# Patient Record
Sex: FEMALE | Race: WHITE | Employment: OTHER | URBAN - METROPOLITAN AREA
[De-identification: names, ages, dates, MRNs, and addresses within clinical notes are randomized per-mention and may not be internally consistent; named-entity substitution may affect disease eponyms.]

---

## 2019-10-22 ENCOUNTER — APPOINTMENT (OUTPATIENT)
Dept: GENERAL RADIOLOGY | Age: 49
End: 2019-10-22
Payer: MEDICAID

## 2019-10-22 ENCOUNTER — HOSPITAL ENCOUNTER (EMERGENCY)
Age: 49
Discharge: HOME | End: 2019-10-22
Attending: EMERGENCY MEDICINE
Payer: MEDICAID

## 2019-10-22 VITALS
DIASTOLIC BLOOD PRESSURE: 70 MMHG | SYSTOLIC BLOOD PRESSURE: 123 MMHG | HEART RATE: 68 BPM | TEMPERATURE: 97.6 F | HEIGHT: 61 IN | WEIGHT: 247.8 LBS | BODY MASS INDEX: 46.78 KG/M2 | OXYGEN SATURATION: 100 % | RESPIRATION RATE: 16 BRPM

## 2019-10-22 DIAGNOSIS — M25.561 ACUTE PAIN OF RIGHT KNEE: Primary | ICD-10-CM

## 2019-10-22 PROCEDURE — 6370000000 HC RX 637 (ALT 250 FOR IP): Performed by: EMERGENCY MEDICINE

## 2019-10-22 PROCEDURE — 99283 EMERGENCY DEPT VISIT LOW MDM: CPT

## 2019-10-22 PROCEDURE — 73560 X-RAY EXAM OF KNEE 1 OR 2: CPT

## 2019-10-22 RX ORDER — IBUPROFEN 600 MG/1
600 TABLET ORAL EVERY 8 HOURS PRN
Qty: 15 TABLET | Refills: 0 | Status: SHIPPED | OUTPATIENT
Start: 2019-10-22 | End: 2019-12-09

## 2019-10-22 RX ORDER — HYDROCODONE BITARTRATE AND ACETAMINOPHEN 5; 325 MG/1; MG/1
1 TABLET ORAL ONCE
Status: COMPLETED | OUTPATIENT
Start: 2019-10-22 | End: 2019-10-22

## 2019-10-22 RX ORDER — IBUPROFEN 600 MG/1
600 TABLET ORAL ONCE
Status: COMPLETED | OUTPATIENT
Start: 2019-10-22 | End: 2019-10-22

## 2019-10-22 RX ADMIN — IBUPROFEN 600 MG: 600 TABLET, FILM COATED ORAL at 13:38

## 2019-10-22 RX ADMIN — HYDROCODONE BITARTRATE AND ACETAMINOPHEN 1 TABLET: 5; 325 TABLET ORAL at 13:38

## 2019-10-22 ASSESSMENT — PAIN DESCRIPTION - PAIN TYPE
TYPE: ACUTE PAIN;CHRONIC PAIN
TYPE: ACUTE PAIN;CHRONIC PAIN

## 2019-10-22 ASSESSMENT — ENCOUNTER SYMPTOMS
TROUBLE SWALLOWING: 0
SHORTNESS OF BREATH: 0
NAUSEA: 0
DIARRHEA: 0
VOICE CHANGE: 0
VOMITING: 0

## 2019-10-22 ASSESSMENT — PAIN DESCRIPTION - DESCRIPTORS: DESCRIPTORS: ACHING

## 2019-10-22 ASSESSMENT — PAIN DESCRIPTION - ORIENTATION: ORIENTATION: RIGHT

## 2019-10-22 ASSESSMENT — PAIN SCALES - GENERAL
PAINLEVEL_OUTOF10: 7
PAINLEVEL_OUTOF10: 8
PAINLEVEL_OUTOF10: 10

## 2019-10-22 ASSESSMENT — PAIN DESCRIPTION - LOCATION: LOCATION: KNEE

## 2019-10-22 ASSESSMENT — PAIN DESCRIPTION - FREQUENCY: FREQUENCY: CONTINUOUS

## 2019-12-09 ENCOUNTER — HOSPITAL ENCOUNTER (EMERGENCY)
Age: 49
Discharge: HOME | End: 2019-12-09
Attending: EMERGENCY MEDICINE
Payer: MEDICAID

## 2019-12-09 VITALS
SYSTOLIC BLOOD PRESSURE: 140 MMHG | TEMPERATURE: 100.4 F | WEIGHT: 244.05 LBS | OXYGEN SATURATION: 99 % | DIASTOLIC BLOOD PRESSURE: 70 MMHG | HEIGHT: 61 IN | HEART RATE: 93 BPM | BODY MASS INDEX: 46.08 KG/M2 | RESPIRATION RATE: 19 BRPM

## 2019-12-09 DIAGNOSIS — B34.9 VIRAL ILLNESS: Primary | ICD-10-CM

## 2019-12-09 DIAGNOSIS — R51.9 VIRAL CEPHALGIA: ICD-10-CM

## 2019-12-09 DIAGNOSIS — B34.9 VIRAL CEPHALGIA: ICD-10-CM

## 2019-12-09 LAB
RAPID INFLUENZA  B AGN: NEGATIVE
RAPID INFLUENZA A AGN: NEGATIVE

## 2019-12-09 PROCEDURE — 87804 INFLUENZA ASSAY W/OPTIC: CPT

## 2019-12-09 PROCEDURE — 96375 TX/PRO/DX INJ NEW DRUG ADDON: CPT

## 2019-12-09 PROCEDURE — 2580000003 HC RX 258: Performed by: EMERGENCY MEDICINE

## 2019-12-09 PROCEDURE — 96361 HYDRATE IV INFUSION ADD-ON: CPT

## 2019-12-09 PROCEDURE — 99283 EMERGENCY DEPT VISIT LOW MDM: CPT

## 2019-12-09 PROCEDURE — 6360000002 HC RX W HCPCS: Performed by: EMERGENCY MEDICINE

## 2019-12-09 PROCEDURE — 96374 THER/PROPH/DIAG INJ IV PUSH: CPT

## 2019-12-09 RX ORDER — DIPHENHYDRAMINE HYDROCHLORIDE 50 MG/ML
50 INJECTION INTRAMUSCULAR; INTRAVENOUS ONCE
Status: COMPLETED | OUTPATIENT
Start: 2019-12-09 | End: 2019-12-09

## 2019-12-09 RX ORDER — LORAZEPAM 2 MG/ML
0.5 INJECTION INTRAMUSCULAR ONCE
Status: COMPLETED | OUTPATIENT
Start: 2019-12-09 | End: 2019-12-09

## 2019-12-09 RX ORDER — METOCLOPRAMIDE HYDROCHLORIDE 5 MG/ML
10 INJECTION INTRAMUSCULAR; INTRAVENOUS ONCE
Status: COMPLETED | OUTPATIENT
Start: 2019-12-09 | End: 2019-12-09

## 2019-12-09 RX ORDER — 0.9 % SODIUM CHLORIDE 0.9 %
1000 INTRAVENOUS SOLUTION INTRAVENOUS ONCE
Status: COMPLETED | OUTPATIENT
Start: 2019-12-09 | End: 2019-12-09

## 2019-12-09 RX ADMIN — DIPHENHYDRAMINE HYDROCHLORIDE 50 MG: 50 INJECTION, SOLUTION INTRAMUSCULAR; INTRAVENOUS at 21:44

## 2019-12-09 RX ADMIN — LORAZEPAM 0.5 MG: 2 INJECTION INTRAMUSCULAR; INTRAVENOUS at 21:43

## 2019-12-09 RX ADMIN — METOCLOPRAMIDE 10 MG: 5 INJECTION, SOLUTION INTRAMUSCULAR; INTRAVENOUS at 21:46

## 2019-12-09 RX ADMIN — SODIUM CHLORIDE 1000 ML: 9 INJECTION, SOLUTION INTRAVENOUS at 21:44

## 2019-12-09 ASSESSMENT — PAIN - FUNCTIONAL ASSESSMENT: PAIN_FUNCTIONAL_ASSESSMENT: 0-10

## 2019-12-09 ASSESSMENT — PAIN DESCRIPTION - FREQUENCY: FREQUENCY: CONTINUOUS

## 2019-12-09 ASSESSMENT — PAIN DESCRIPTION - DESCRIPTORS: DESCRIPTORS: HEADACHE;THROBBING

## 2019-12-09 ASSESSMENT — PAIN SCALES - GENERAL
PAINLEVEL_OUTOF10: 4
PAINLEVEL_OUTOF10: 10
PAINLEVEL_OUTOF10: 4

## 2021-06-02 ENCOUNTER — CLINICAL DOCUMENTATION (OUTPATIENT)
Dept: OTHER | Age: 51
End: 2021-06-02

## 2021-11-15 ENCOUNTER — CLINICAL DOCUMENTATION (OUTPATIENT)
Dept: OTHER | Age: 51
End: 2021-11-15

## 2021-11-30 ENCOUNTER — HOSPITAL ENCOUNTER (EMERGENCY)
Age: 51
Discharge: HOME | End: 2021-11-30
Attending: EMERGENCY MEDICINE
Payer: MEDICAID

## 2021-11-30 VITALS
RESPIRATION RATE: 15 BRPM | DIASTOLIC BLOOD PRESSURE: 63 MMHG | HEART RATE: 86 BPM | BODY MASS INDEX: 44.33 KG/M2 | HEIGHT: 61 IN | OXYGEN SATURATION: 97 % | SYSTOLIC BLOOD PRESSURE: 98 MMHG | TEMPERATURE: 98.1 F | WEIGHT: 234.79 LBS

## 2021-11-30 DIAGNOSIS — R51.9 ACUTE NONINTRACTABLE HEADACHE, UNSPECIFIED HEADACHE TYPE: Primary | ICD-10-CM

## 2021-11-30 DIAGNOSIS — Z86.69 H/O MIGRAINE: ICD-10-CM

## 2021-11-30 PROCEDURE — 96375 TX/PRO/DX INJ NEW DRUG ADDON: CPT

## 2021-11-30 PROCEDURE — 6360000002 HC RX W HCPCS: Performed by: EMERGENCY MEDICINE

## 2021-11-30 PROCEDURE — 99284 EMERGENCY DEPT VISIT MOD MDM: CPT

## 2021-11-30 PROCEDURE — 2580000003 HC RX 258: Performed by: EMERGENCY MEDICINE

## 2021-11-30 PROCEDURE — 96374 THER/PROPH/DIAG INJ IV PUSH: CPT

## 2021-11-30 RX ORDER — 0.9 % SODIUM CHLORIDE 0.9 %
1000 INTRAVENOUS SOLUTION INTRAVENOUS ONCE
Status: COMPLETED | OUTPATIENT
Start: 2021-11-30 | End: 2021-11-30

## 2021-11-30 RX ORDER — BUTALBITAL, ACETAMINOPHEN AND CAFFEINE 300; 40; 50 MG/1; MG/1; MG/1
1 CAPSULE ORAL EVERY 6 HOURS PRN
Qty: 20 CAPSULE | Refills: 0 | Status: SHIPPED | OUTPATIENT
Start: 2021-11-30 | End: 2022-06-23

## 2021-11-30 RX ORDER — IBUPROFEN 600 MG/1
600 TABLET ORAL EVERY 8 HOURS PRN
Qty: 20 TABLET | Refills: 0 | Status: SHIPPED | OUTPATIENT
Start: 2021-11-30 | End: 2022-09-29

## 2021-11-30 RX ORDER — ONDANSETRON 4 MG/1
4 TABLET, ORALLY DISINTEGRATING ORAL EVERY 8 HOURS PRN
Qty: 20 TABLET | Refills: 0 | Status: SHIPPED | OUTPATIENT
Start: 2021-11-30 | End: 2021-12-07

## 2021-11-30 RX ORDER — KETOROLAC TROMETHAMINE 15 MG/ML
15 INJECTION, SOLUTION INTRAMUSCULAR; INTRAVENOUS ONCE
Status: COMPLETED | OUTPATIENT
Start: 2021-11-30 | End: 2021-11-30

## 2021-11-30 RX ORDER — DIPHENHYDRAMINE HYDROCHLORIDE 50 MG/ML
25 INJECTION INTRAMUSCULAR; INTRAVENOUS ONCE
Status: COMPLETED | OUTPATIENT
Start: 2021-11-30 | End: 2021-11-30

## 2021-11-30 RX ORDER — METOCLOPRAMIDE HYDROCHLORIDE 5 MG/ML
10 INJECTION INTRAMUSCULAR; INTRAVENOUS ONCE
Status: COMPLETED | OUTPATIENT
Start: 2021-11-30 | End: 2021-11-30

## 2021-11-30 RX ADMIN — SODIUM CHLORIDE 1000 ML: 9 INJECTION, SOLUTION INTRAVENOUS at 21:05

## 2021-11-30 RX ADMIN — METOCLOPRAMIDE HYDROCHLORIDE 10 MG: 5 INJECTION INTRAMUSCULAR; INTRAVENOUS at 21:07

## 2021-11-30 RX ADMIN — DIPHENHYDRAMINE HYDROCHLORIDE 25 MG: 50 INJECTION, SOLUTION INTRAMUSCULAR; INTRAVENOUS at 21:07

## 2021-11-30 RX ADMIN — KETOROLAC TROMETHAMINE 15 MG: 15 INJECTION, SOLUTION INTRAMUSCULAR; INTRAVENOUS at 21:08

## 2021-11-30 ASSESSMENT — PAIN DESCRIPTION - PAIN TYPE: TYPE: ACUTE PAIN

## 2021-11-30 ASSESSMENT — PAIN DESCRIPTION - FREQUENCY: FREQUENCY: CONTINUOUS

## 2021-11-30 ASSESSMENT — PAIN DESCRIPTION - DESCRIPTORS: DESCRIPTORS: THROBBING

## 2021-11-30 ASSESSMENT — PAIN SCALES - GENERAL
PAINLEVEL_OUTOF10: 8
PAINLEVEL_OUTOF10: 1
PAINLEVEL_OUTOF10: 0

## 2021-11-30 ASSESSMENT — PAIN DESCRIPTION - PROGRESSION: CLINICAL_PROGRESSION: GRADUALLY WORSENING

## 2021-11-30 ASSESSMENT — PAIN DESCRIPTION - ONSET: ONSET: ON-GOING

## 2021-11-30 ASSESSMENT — PAIN DESCRIPTION - LOCATION: LOCATION: HEAD

## 2021-11-30 ASSESSMENT — PAIN DESCRIPTION - DIRECTION: RADIATING_TOWARDS: ALL OVER

## 2021-12-01 NOTE — ED PROVIDER NOTES
Emergency Physician Note  [facility] [address]  Dept: [phone]  Loc: [phone]  Open Note Time:  8:49 PM EST    Chief Complaint  Migraine (x1day, denies n/v)       History of Present Illness  Lonnie Ricardo is a 54 y.o. female  has a past medical history of Arthritis, Back pain, and Headache. who presents to the ED for headache for 1 day. Patient does have a history of migraines. She states this headache is typical of the migraine she has had in the past in which she has pain all over but she specifically notes bilateral temporal pain that she describes as a throbbing pain. She states she tried Excedrin at home without relief. The pain is associated with photophobia but not phonophobia. She is not had any fever or neck pain. It was not thunderclap in onset. She is not had nausea or vomiting. Denies fever,  chest pain, shortness of breath, cough, abdominal pain, nausea, vomiting, diarrhea,  sore throat, dysuria, back pain, rash. No palliative/provocative factors. Unless otherwise stated in this report or unable to obtain because of the patient's clinical or mental status as evidenced by the medical record, this patient's positive and negative responses for review of systems, constitutional, psych, eyes, ENT, cardiovascular, respiratory, gastrointestinal, neurological, genitourinary, musculoskeletal, integument systems and systems related to the presenting problem are either stated in the preceding paragraph or were not pertinent or were negative for the symptoms and/or complaints related to the medical problem.     I have reviewed the following from the nursing documentation:      Prior to Admission medications    Not on File       Allergies as of 11/30/2021 - Fully Reviewed 11/30/2021   Allergen Reaction Noted    Amoxicillin  07/24/2016    Penicillins  07/24/2016       Past Medical History: Diagnosis Date    Arthritis     Back pain     Headache         Surgical History:   Past Surgical History:   Procedure Laterality Date     SECTION          Family History:  History reviewed. No pertinent family history. Social History     Socioeconomic History    Marital status: Single     Spouse name: Not on file    Number of children: Not on file    Years of education: Not on file    Highest education level: Not on file   Occupational History    Not on file   Tobacco Use    Smoking status: Never Smoker    Smokeless tobacco: Never Used   Substance and Sexual Activity    Alcohol use: No    Drug use: No    Sexual activity: Not Currently   Other Topics Concern    Not on file   Social History Narrative    Not on file     Social Determinants of Health     Financial Resource Strain:     Difficulty of Paying Living Expenses: Not on file   Food Insecurity:     Worried About Running Out of Food in the Last Year: Not on file    Quita of Food in the Last Year: Not on file   Transportation Needs:     Lack of Transportation (Medical): Not on file    Lack of Transportation (Non-Medical):  Not on file   Physical Activity:     Days of Exercise per Week: Not on file    Minutes of Exercise per Session: Not on file   Stress:     Feeling of Stress : Not on file   Social Connections:     Frequency of Communication with Friends and Family: Not on file    Frequency of Social Gatherings with Friends and Family: Not on file    Attends Restorationist Services: Not on file    Active Member of Clubs or Organizations: Not on file    Attends Club or Organization Meetings: Not on file    Marital Status: Not on file   Intimate Partner Violence:     Fear of Current or Ex-Partner: Not on file    Emotionally Abused: Not on file    Physically Abused: Not on file    Sexually Abused: Not on file   Housing Stability:     Unable to Pay for Housing in the Last Year: Not on file    Number of [place] in the Last Year: Not on file    Unstable Housing in the Last Year: Not on file       Nursing notes reviewed. ED Triage Vitals [11/30/21 1958]   Enc Vitals Group      /75      Pulse 87      Resp 20      Temp 98.1 °F (36.7 °C)      Temp Source Oral      SpO2 98 %      Weight 234 lb 12.6 oz (106.5 kg)      Height 5' 1\" (1.549 m)      Head Circumference       Peak Flow       Pain Score       Pain Loc       Pain Edu? Excl. in [address]? GENERAL:   Body mass index is 44.36 kg/m². Awake, alert. Well developed, well nourished with no apparent distress. Nontoxic-appearing, non-ill-appearing. HENT:   Normocephalic, Atraumatic, no lacerations. No ENT exam due to PPE. EYES:   Conjunctiva normal,   Pupils equal round and reactive to light,   Extraocular movements normal.  NECK:  Trachea is midline. No stridor. CHEST:  Regular rate and regular rhythm, no murmurs/rubs/gallops,  normal S1/S2, chest wall non-tender. LUNGS:  No respiratory distress. No abdominal retractions, no sternal retractions  Clear to auscultation bilaterally, no wheezing, no rhochi, no rales  Speaking comfortably in full sentences  ABDOMEN:  Soft, non-tender, non-distended. No guarding. No rebound. No costovertebral angle tenderness to palpation. Normal BS, no organomegaly, no abdominal masses  EXTREMITIES:  Moves extremities x4 with purpose. Normal range of motion, no edema,  No tenderness, no deformity,  distal pulses present and equal bilaterally. BACK:  No midline tenderness in the cervical, thoracic, and lumbar spine. No deformities, no step-off. SKIN:  Warm, dry and intact. NEUROLOGIC:  Normal mental status. Moving all extremities to command. Alert and oriented x4  without focal motor deficit or gross sensory deficit. Normal speech. Strength 5/5 in bilateral upper and lower extremities. Sensation is intact in the upper and lower extremities.   PSYCHIATRIC:  Not anxious,  normal mood and affect,  Appropriate eye contact,  thoughts are linear and organized,  without delusions/hallucinations,  Not responding to internal stimuli,  responds appropriately to questions    LABS and DIAGNOSTIC RESULTS      RADIOLOGY  X-RAYS:  I have reviewed radiologic plain film image(s). ALL OTHER NON-PLAIN FILM IMAGES SUCH AS CT, ULTRASOUND AND MRI HAVE BEEN READ BY THE RADIOLOGIST. No orders to display            LABS  No results found for this visit on 11/30/21. SCREENINGS  NIH Score     [facility]     [facility]    Heart Score       PROCEDURES    MEDICAL DECISION MAKING    Procedures/interventions/images ordered for this visit  No orders of the defined types were placed in this encounter. Medications ordered for this visit  Orders Placed This Encounter   Medications    ketorolac (TORADOL) injection 15 mg    metoclopramide (REGLAN) injection 10 mg    diphenhydrAMINE (BENADRYL) injection 25 mg    0.9 % sodium chloride bolus       ED course notes for this visit  ED Course as of 11/30/21 2307 Tue Nov 30, 2021   2223 Reports feeling significantly better, she reports that her headache is completely resolved. She does have a referral to another neurologist, she cannot remember the name of this doctor. However she states she will call for seen in the morning to get an appointment. [SG]      ED Course User Index  [SG] David Wilkinson MD       I evaluated the patient in room QC 10/10        This is a pleasant patient with a headache. There are no significant findings indicating subarachnoid hemorrhage, venous sinus thrombosis, epidural hematoma, subdural hematoma, meningitis, encephalitis, glaucoma, temporal arteritis, or other new intracranial, vascular, infectious, ophthalmological, or systemic processes requiring immediate medical or surgical intervention at this time.  I do not see indication for labs or imaging studies at this time based on the fact that this is the same headache as this patient has had many times in the past, it is not different in character or quality, it is not the worst headache of their life, and it was not thunderclap in onset. Also, in consideration of SAH, patient does not have high-risk historical features of SAH (  maximal intensity of headache within 1 hr of onset, worst headache of life, neck pain or stiffness, loss of consciousness, onset during exertion, ambulance arrival, vomiting). At the time of d/c home, the patient is ambulatory, pain has improved, vital signs are stable and there are no neurological deficits. I completed a structured, evidence-based clinical evaluation to screen for subarachnoid hemorrhage. The evidence indicates that the patient is very low risk for [facility] and this is consistent with my clinical intuition. The risk of further workup or hospitalization is likely higher than the risk of the patient having a subarachnoid hemorrhage. It is, therefore, in the patients best interest not to do additional emergent testing. I have discussed with the patient my clinical impression and the result of an evidence-based clinical evaluation to screen for [facility], as well as the risk of further testing and hospitalization. The evidence shows that the risk for subarachnoid hemorrhage is less than 1/200 or 0.5%. Further testing involves either invasive angiogram with about a 1% risk of serious complications, or hospitalization, with about a 1% risk of serious complications. The patient understands the residual risk of SAH and the risk of further testing and declines further emergent evaluation or hospitalization for subarachnoid hemorrhage. It is understood that if the patient is not improving as expected or if other new symptoms or signs of concern develop, other etiologies or diagnoses may need to be considered requiring other tests, treatments, consultations, and/or admission. The diagnosis, plan, expected course, follow-up, and return precautions were discussed and all questions were answered. Final Impression    1. Acute nonintractable headache, unspecified headache type    2. H/O migraine        Blood pressure 98/63, pulse 86, temperature 98.1 °F (36.7 °C), temperature source Oral, resp. rate 15, height 5' 1\" (1.549 m), weight 234 lb 12.6 oz (106.5 kg), SpO2 97 %. Medication Summary  Patient was given scripts for the following medications. I counseled patient how to take these medications. New Prescriptions    BUTALBITAL-APAP-CAFFEINE (FIORICET) -40 MG CAPS PER CAPSULE    Take 1 capsule by mouth every 6 hours as needed for Headaches or Migraine    IBUPROFEN (ADVIL;MOTRIN) 600 MG TABLET    Take 1 tablet by mouth every 8 hours as needed for Pain (Headache)    ONDANSETRON (ZOFRAN-ODT) 4 MG DISINTEGRATING TABLET    Place 1 tablet under the tongue every 8 hours as needed for Nausea or Vomiting       Patient had scripts modified or refilled for the following medications. I counseled patient how to take these medications. Modified Medications    No medications on file       Patient had scripts discontinues for the following medications. I counseled patient to stop taking these medications. Discontinued Medications    No medications on file         Disposition  At this point I do not feel the patient requires further work up and it is reasonable to discharge the patient. I had a discussion with the patient and/or their surrogate regarding diagnosis, diagnostic testing results, treatment/ plan of care, and follow up. Patient and/or companions verbalized understanding of the ED workup, any relevant findings as well as any incidental findings, and the disposition and plan. There was shared decision-making between myself as well as the patient and/or their surrogate and we are all in agreement with discharge home. Zoila Syed was an opportunity for questions and all questions were answered to the best of my ability and to the satisfaction of the patient and/or patient's family.  Patient agreed to follow up as recommend for further evaluation/treatment. The patient was given strict return precautions as we discussed symptoms that would necessitate return to the ED. Patient will return to ED for new/worsening symptoms. The patient verbalized their understanding and agreement with the above plan. Please refer to AVS for further details regarding discharge instructions. Pt is in stable condition upon Discharge to home. Pt was seen during the [place] 19 pandemic. Appropriate PPE worn by this writer during patient encounters. Pt seen during a time with constrained hospital bed capacity and other potential inpatient and outpatient resources were constrained due to the viral pandemic. The note was completed using Dragon voice recognition transcription. Every effort was made to ensure accuracy; however, inadvertent transcription errors may be present despite my best efforts to edit errors.     Ty Roman MD  [facility]        Ty Roman MD  11/30/21 9584

## 2021-12-01 NOTE — ED NOTES
Discharge and education instructions reviewed. Patient verbalized understanding, teach-back successful. Patient denied questions at this time. No acute distress noted. Patient instructed to follow-up as noted - return to emergency department if symptoms worsen. Patient verbalized understanding. Discharged per EDMD with discharge instructions.         [address]agan RN  11/30/21 8165

## 2021-12-01 NOTE — ED NOTES
Pt sleeping in room. States headache has improved. Dr Betzaida Hong notified.      [address] [facility], RN  11/30/21 9496

## 2021-12-01 NOTE — ED NOTES
Pt to ED with complaints of migraine that began yesterday afternoon. States it is getting progressively worse. Pt has hx of migraines. States used to see neurologist but in between at the moment. Did not take any medications. Denies changes in vision, nausea, and vomiting. A/ox4. Pt resting in a dark room.       [address], RN  11/30/21 2006

## 2022-01-31 ENCOUNTER — HOSPITAL ENCOUNTER (EMERGENCY)
Age: 52
Discharge: HOME | End: 2022-01-31
Attending: EMERGENCY MEDICINE
Payer: MEDICAID

## 2022-01-31 VITALS
TEMPERATURE: 97.9 F | RESPIRATION RATE: 19 BRPM | SYSTOLIC BLOOD PRESSURE: 138 MMHG | DIASTOLIC BLOOD PRESSURE: 86 MMHG | HEART RATE: 78 BPM | OXYGEN SATURATION: 98 %

## 2022-01-31 DIAGNOSIS — R22.1 NECK SWELLING: Primary | ICD-10-CM

## 2022-01-31 PROCEDURE — 6360000002 HC RX W HCPCS: Performed by: EMERGENCY MEDICINE

## 2022-01-31 PROCEDURE — 96372 THER/PROPH/DIAG INJ SC/IM: CPT

## 2022-01-31 PROCEDURE — 6370000000 HC RX 637 (ALT 250 FOR IP): Performed by: EMERGENCY MEDICINE

## 2022-01-31 PROCEDURE — 99283 EMERGENCY DEPT VISIT LOW MDM: CPT

## 2022-01-31 RX ORDER — KETOROLAC TROMETHAMINE 30 MG/ML
15 INJECTION, SOLUTION INTRAMUSCULAR; INTRAVENOUS ONCE
Status: COMPLETED | OUTPATIENT
Start: 2022-01-31 | End: 2022-01-31

## 2022-01-31 RX ORDER — LIDOCAINE 4 G/G
1 PATCH TOPICAL ONCE
Status: DISCONTINUED | OUTPATIENT
Start: 2022-01-31 | End: 2022-01-31 | Stop reason: HOSPADM

## 2022-01-31 RX ORDER — NAPROXEN 500 MG/1
500 TABLET ORAL 2 TIMES DAILY WITH MEALS
Qty: 60 TABLET | Refills: 5 | Status: SHIPPED | OUTPATIENT
Start: 2022-01-31

## 2022-01-31 RX ADMIN — KETOROLAC TROMETHAMINE 15 MG: 30 INJECTION, SOLUTION INTRAMUSCULAR at 21:14

## 2022-01-31 ASSESSMENT — PAIN SCALES - GENERAL: PAINLEVEL_OUTOF10: 8

## 2022-02-01 ASSESSMENT — ENCOUNTER SYMPTOMS
SORE THROAT: 0
COLOR CHANGE: 0
TROUBLE SWALLOWING: 0
VOMITING: 0
ABDOMINAL PAIN: 0
COUGH: 0
PHOTOPHOBIA: 0
SHORTNESS OF BREATH: 0

## 2022-02-01 NOTE — ED PROVIDER NOTES
[address] [facility]  [facility]      Pt Name: Ritchie Villagomez  MRN: [id number]  [place] 1970  Date ofevaluation: 1/31/2022  Provider: Carlito Pyle MD    CHIEF COMPLAINT       Chief Complaint   Patient presents with    Neck Swelling         HISTORY OF PRESENT ILLNESS   (Location/Symptom, Timing/Onset,Context/Setting, Quality, Duration, Modifying Factors, Severity)  Note limiting factors. Ritchie Villagomez is a 54 y.o. female  who  has a past medical history of Arthritis, Back pain, and Headache. who presents to the emergency department for evaluation of swelling under the angle of the right jaw. Patient reports that she been having episodes of swelling under the right jaw since she was a child. She reports that symptoms will resolve spontaneously and recur spontaneously. She states that the areas become tender. She states that for the past few days she has noticed swelling to the angle of the right jaw. She does report is slightly tender. Denies ear pain or sore throat. Denies fevers or overlying skin changes. Denies difficulties breathing or swallowing. Denies changes in voice. She has not taken medications for the symptoms. HPI    NursingNotes were reviewed. REVIEW OF SYSTEMS    (2-9 systems for level 4, 10 or more for level 5)     Review of Systems   Constitutional: Negative for activity change, fatigue and fever. HENT: Negative for congestion, drooling, mouth sores, sore throat and trouble swallowing. Eyes: Negative for photophobia and visual disturbance. Respiratory: Negative for cough and shortness of breath. Cardiovascular: Negative for chest pain and palpitations. Gastrointestinal: Negative for abdominal pain and vomiting. Genitourinary: Negative for difficulty urinating and frequency. Musculoskeletal: Positive for neck pain. Negative for gait problem. Skin: Negative for color change and rash.    Neurological: Negative for dizziness, light-headedness and headaches. Psychiatric/Behavioral: Negative for confusion. The patient is not nervous/anxious. All other systems reviewed and are negative. Except as noted above the remainder of the review of systems was reviewed and negative. PAST MEDICAL HISTORY     Past Medical History:   Diagnosis Date    Arthritis     Back pain     Headache          SURGICALHISTORY       Past Surgical History:   Procedure Laterality Date     SECTION           CURRENT MEDICATIONS       Discharge Medication List as of 2022  9:20 PM      CONTINUE these medications which have NOT CHANGED    Details   butalbital-APAP-caffeine (FIORICET) -40 MG CAPS per capsule Take 1 capsule by mouth every 6 hours as needed for Headaches or Migraine, Disp-20 capsule, R-0Print      ibuprofen (ADVIL;MOTRIN) 600 MG tablet Take 1 tablet by mouth every 8 hours as needed for Pain (Headache), Disp-20 tablet, R-0Print                  Amoxicillin and Penicillins    FAMILY HISTORY     No family history on file. SOCIAL HISTORY       Social History     Socioeconomic History    Marital status: Single     Spouse name: Not on file    Number of children: Not on file    Years of education: Not on file    Highest education level: Not on file   Occupational History    Not on file   Tobacco Use    Smoking status: Never Smoker    Smokeless tobacco: Never Used   Substance and Sexual Activity    Alcohol use: No    Drug use: No    Sexual activity: Not Currently   Other Topics Concern    Not on file   Social History Narrative    Not on file     Social Determinants of Health     Financial Resource Strain:     Difficulty of Paying Living Expenses: Not on file   Food Insecurity:     Worried About Running Out of Food in the Last Year: Not on file    Quita of Food in the Last Year: Not on file   Transportation Needs:     Lack of Transportation (Medical):  Not on file    Lack of Transportation (Non-Medical): Not on file   Physical Activity:     Days of Exercise per Week: Not on file    Minutes of Exercise per Session: Not on file   Stress:     Feeling of Stress : Not on file   Social Connections:     Frequency of Communication with Friends and Family: Not on file    Frequency of Social Gatherings with Friends and Family: Not on file    Attends Latter-day Services: Not on file    Active Member of [address] [place] [address] or Organizations: Not on file    Attends Club or Organization Meetings: Not on file    Marital Status: Not on file   Intimate Partner Violence:     Fear of Current or Ex-Partner: Not on file    Emotionally Abused: Not on file    Physically Abused: Not on file    Sexually Abused: Not on file   Housing Stability:     Unable to Pay for Housing in the Last Year: Not on file    Number of [place] in the Last Year: Not on file    Unstable Housing in the Last Year: Not on file       SCREENINGS             PHYSICAL EXAM    (up to 7 for level 4, 8 or more for level 5)     ED Triage Vitals [01/31/22 2047]   BP Temp Temp Source Pulse Resp SpO2 Height Weight   138/86 97.9 °F (36.6 °C) Oral 78 19 98 % -- --       Physical Exam  Vitals and nursing note reviewed. Constitutional:       Appearance: She is well-developed. HENT:      Head: Normocephalic and atraumatic. Eyes:      Conjunctiva/sclera: Conjunctivae normal.      Pupils: Pupils are equal, round, and reactive to light. Neck:      Trachea: No tracheal deviation. Cardiovascular:      Rate and Rhythm: Normal rate and regular rhythm. Heart sounds: Normal heart sounds. Pulmonary:      Effort: Pulmonary effort is normal.      Breath sounds: Normal breath sounds. Abdominal:      General: There is no distension. Palpations: Abdomen is soft. Tenderness: There is no abdominal tenderness. Musculoskeletal:         General: Normal range of motion. Cervical back: Normal range of motion.    Lymphadenopathy:      Cervical: Cervical adenopathy present. Skin:     General: Skin is warm and dry. Capillary Refill: Capillary refill takes less than 2 seconds. Neurological:      General: No focal deficit present. Mental Status: She is alert and oriented to person, place, and time. Mental status is at baseline. Cranial Nerves: No cranial nerve deficit. Sensory: No sensory deficit. Motor: No weakness. RESULTS     EKG: All EKG's are interpreted by the Emergency Department Physician who either signs or Co-signsthis chart in the absence of a cardiologist.      RADIOLOGY:   Myna Comp such as CT, Ultrasound and MRI are read by the radiologist. Plain radiographic images are visualized and preliminarily interpreted by the emergency physician with the below findings:      Interpretation per the Radiologist below, if available at the time ofthis note:    No orders to display         ED BEDSIDE ULTRASOUND:   Performed by ED Physician - none    LABS:  Labs Reviewed - No data to display    All other labs were within normal range or not returned as of this dictation. EMERGENCY DEPARTMENT COURSE and DIFFERENTIAL DIAGNOSIS/MDM:   Vitals:    Vitals:    01/31/22 2047   BP: 138/86   Pulse: 78   Resp: 19   Temp: 97.9 °F (36.6 °C)   TempSrc: Oral   SpO2: 98%       Patient was given thefollowing medications:  Medications   ketorolac (TORADOL) injection 15 mg (15 mg IntraMUSCular Given 1/31/22 2114)       ED COURSE & MEDICAL DECISION MAKING    Pertinent Labs & Imaging studies reviewed. (See chart for details)   -  Patient seen and evaluated in the emergency department. -  Triage and nursing notes reviewed and incorporated. -  Old chart records reviewed and incorporated.   -  Differential diagnosis includes: Differential Diagnosis: Spinal cord compression, nerve root compression, torticollis cervical discitis, osteomyelitis of the vertebral bodies, carotid dissection, spinal fracture or dislocation, Intracranial Bleed, other.     -  Work-up included:  See above  -  ED treatment included: See above  -  Results discussed with patient. Patient resents the ED for evaluation of pain and swelling to the right side of her neck. She does have some soft tissue induration without overlying erythema or fluctuance. Suspect either lymphadenopathy or possible synovitis. Patient denies any difficulty swallowing or breathing. No signs of airway compromise. Patient will be treated symptomatically and given ENT referral.  Patient feels improved on reevaluation. Symptomatic treatment with expectant management discussed with the patient and they and/or family members present are amenable to treatment plan and outpatient follow-up. Strict return precautions were discussed with the patient and those present. They demonstrated understanding of when to return to the emergency department for new or worsening symptoms. .  The patient is agreeable with plan of care and disposition. REASSESSMENT          CRITICAL CARE TIME   Total Critical Care time was 0 minutes, excluding separately reportable procedures. There was a high probability of clinically significant/life threatening deterioration in the patient's condition which required my urgent intervention. CONSULTS:  None    PROCEDURES:  Unless otherwise noted below, none     Procedures    FINAL IMPRESSION      1.  Neck swelling          DISPOSITION/PLAN   DISPOSITION Decision To Discharge 01/31/2022 09:00:22 PM      PATIENT REFERREDTO:  [address] [facility] [address]  [address] [facility]  [phone]    Schedule an appointment as soon as possible for a visit   As needed    TATIANA [address]  [address] [address]  [phone]    Schedule an appointment as soon as possible for a visit   As needed      DISCHARGEMEDICATIONS:  Discharge Medication List as of 1/31/2022  9:20 PM      START taking these medications    Details   naproxen (NAPROSYN) 500 MG tablet Take 1 tablet by mouth 2 times daily (with meals), Disp-60 tablet, R-5Print                (Please note that portions of this note were completed with a voice recognition program.  Efforts were made to edit the dictations but occasionally words are mis-transcribed.)    Ty Hill MD (electronically signed)  Attending Emergency Physician          Ty Hill MD  02/01/22 5415

## 2022-02-01 NOTE — ED TRIAGE NOTES
Patient complaining of swelling and pain since Saturday. Patient states that swelling is getting worse.

## 2022-03-16 ENCOUNTER — OFFICE VISIT (OUTPATIENT)
Dept: ENT CLINIC | Age: 52
End: 2022-03-16
Payer: MEDICAID

## 2022-03-16 VITALS
WEIGHT: 241 LBS | SYSTOLIC BLOOD PRESSURE: 146 MMHG | BODY MASS INDEX: 45.54 KG/M2 | HEART RATE: 64 BPM | DIASTOLIC BLOOD PRESSURE: 84 MMHG

## 2022-03-16 DIAGNOSIS — R22.1 NECK MASS: Primary | ICD-10-CM

## 2022-03-16 PROCEDURE — G8417 CALC BMI ABV UP PARAM F/U: HCPCS | Performed by: OTOLARYNGOLOGY

## 2022-03-16 PROCEDURE — 99203 OFFICE O/P NEW LOW 30 MIN: CPT | Performed by: OTOLARYNGOLOGY

## 2022-03-16 PROCEDURE — G8484 FLU IMMUNIZE NO ADMIN: HCPCS | Performed by: OTOLARYNGOLOGY

## 2022-03-16 PROCEDURE — 1036F TOBACCO NON-USER: CPT | Performed by: OTOLARYNGOLOGY

## 2022-03-16 PROCEDURE — G8427 DOCREV CUR MEDS BY ELIG CLIN: HCPCS | Performed by: OTOLARYNGOLOGY

## 2022-03-16 NOTE — PROGRESS NOTES
[place]      [facility] Record Number:  [id number]  [facility] Care Phys[facility]  [facility]  Date of Consultation: 3/16/2022    Chief Complaint: Neck mass        HISTORY OF PRESENT ILLNESS  Katelyn Orr is a(n) 54 y.o. female who presents for evaluation of a neck mass. The patient said that in late January she started having some swelling of the right face. She was seen in the emergency room and says that they did not have to treat her with anything. The swelling got better, but she still feels a lump below her right ear. She says that she is not a smoker. She denies coughing up blood, weight loss or other symptoms. She says that the left side has been okay. She has never had this before. REVIEW OF SYSTEMS  As above    PHYSICAL EXAM  GENERAL: No Acute Distress, Alert and Oriented, no Hoarseness, strong voice  EYES: EOMI, Anti-icteric  HENT:   Head: Normocephalic and atraumatic. Face:  Symmetric, facial nerve intact, no sinus tenderness  Right Ear: Normal external ear, normal external auditory canal, intact tympanic membrane with normal mobility and aerated middle ear  Left Ear: Normal external ear, normal external auditory canal, intact tympanic membrane with normal mobility and aerated middle ear  Mouth/Oral Cavity:  normal lips, Uvula is midline, no mucosal lesions  Oropharynx/Larynx:  normal oropharynx  Nose:Normal external nasal appearance. NECK: Approximately 1.5 cm minimally tender lesion below the angle of the mandible on the right side. No other lymphadenopathy noted          ASSESSMENT/PLAN  1. Neck mass  This could just be a lymph node associated with a parotitis based on her description, but I certainly cannot rule out a primary neck mass or a parotid neoplasm. I would order CT scan of the neck with contrast.  I would like to see her the same day that she gets the scan.   Depending what this shows we can probably do an FNA in clinic. Patient understands the plan. - CT SOFT TISSUE NECK W CONTRAST; Future             I have performed a head and neck physical exam personally or was physically present during the key or critical portions of the service. This note was generated completely or in part utilizing Dragon dictation speech recognition software. Occasionally, words are mistranscribed and despite editing, the text may contain inaccuracies due to incorrect word recognition. If further clarification is needed please contact the office at [phone].

## 2022-03-31 ENCOUNTER — HOSPITAL ENCOUNTER (OUTPATIENT)
Dept: CT IMAGING | Age: 52
Discharge: HOME | End: 2022-03-31
Payer: MEDICAID

## 2022-03-31 DIAGNOSIS — R22.1 NECK MASS: ICD-10-CM

## 2022-03-31 PROCEDURE — 70491 CT SOFT TISSUE NECK W/DYE: CPT

## 2022-03-31 PROCEDURE — 6360000004 HC RX CONTRAST MEDICATION: Performed by: OTOLARYNGOLOGY

## 2022-03-31 RX ADMIN — IOPAMIDOL 100 ML: 755 INJECTION, SOLUTION INTRAVENOUS at 10:06

## 2022-04-04 ENCOUNTER — OFFICE VISIT (OUTPATIENT)
Dept: ENT CLINIC | Age: 52
End: 2022-04-04
Payer: MEDICAID

## 2022-04-04 VITALS
HEART RATE: 76 BPM | WEIGHT: 246 LBS | DIASTOLIC BLOOD PRESSURE: 85 MMHG | SYSTOLIC BLOOD PRESSURE: 134 MMHG | BODY MASS INDEX: 46.44 KG/M2 | HEIGHT: 61 IN

## 2022-04-04 DIAGNOSIS — K11.8 PAROTID MASS: Primary | ICD-10-CM

## 2022-04-04 PROCEDURE — G8417 CALC BMI ABV UP PARAM F/U: HCPCS | Performed by: OTOLARYNGOLOGY

## 2022-04-04 PROCEDURE — 1036F TOBACCO NON-USER: CPT | Performed by: OTOLARYNGOLOGY

## 2022-04-04 PROCEDURE — G8427 DOCREV CUR MEDS BY ELIG CLIN: HCPCS | Performed by: OTOLARYNGOLOGY

## 2022-04-04 PROCEDURE — 99213 OFFICE O/P EST LOW 20 MIN: CPT | Performed by: OTOLARYNGOLOGY

## 2022-04-04 NOTE — PROGRESS NOTES
[address] [facility]K SURGERY  Follow up      Patient Name: [address] Record Number:  [id number]  Primary Care Physician:  Nikolai Green [facility]  Date of Consultation: 4/4/2022    Chief Complaint: Neck mass        Interval History  Patient is following up for neck mass. I saw her on March 16, 2022. I felt as though this could be either a level 2 neck mass or a tail of parotid lesion. I had her get a CT scan. She says it still little bit sore. No other changes. REVIEW OF SYSTEMS  As above    PHYSICAL EXAM  GENERAL: No Acute Distress, Alert and Oriented, no Hoarseness, strong voice  EYES: EOMI, Anti-icteric  HENT:   Head: Normocephalic and atraumatic. Face: The fair aspect of the parotid and high in level 2 you can feel a firm area consistent with what seen on CT scan  Right Ear: Normal external ear, normal external auditory canal, intact tympanic membrane with normal mobility and aerated middle ear  Left Ear: Normal external ear, normal external auditory canal, intact tympanic membrane with normal mobility and aerated middle ear  Mouth/Oral Cavity:  normal lips, Uvula is midline, no mucosal lesions  Oropharynx/Larynx:  normal oropharynx,   Nose:Normal external nasal appearance. NECK: Normal range of motion, no thyromegaly, trachea is midline, no lymphadenopathy, no neck masses, no crepitus        RADIOLOGY  Summary of findings:  I reviewed the patient's CT scan. In the tail the parotid on the right side there is what appears to be a multinodular. The rest of the parotid gland appears to be normal as well as the left parotid gland            ASSESSMENT/PLAN  1. Parotid mass  Not entirely sure what this represents. This could just be some enlarged lymph nodes from a parotitis, however this is a little unusual to have just in this 1 location. I feel as though some of the nodules are large enough to get an FNA.   I would have her see radiology for an ultrasound-guided FNA of this area. She will follow-up afterwards and we can discuss the results. - US FINE NEEDLE ASPIRATION; Future             I have performed a head and neck physical exam personally or was physically present during the key or critical portions of the service. This note was generated completely or in part utilizing Dragon dictation speech recognition software. Occasionally, words are mistranscribed and despite editing, the text may contain inaccuracies due to incorrect word recognition. If further clarification is needed please contact the office at [phone].

## 2022-04-06 ENCOUNTER — TELEPHONE (OUTPATIENT)
Dept: ENT CLINIC | Age: 52
End: 2022-04-06

## 2022-04-06 NOTE — TELEPHONE ENCOUNTER
She needs to have her test done at [facility] I called and gave her that info and she said ok

## 2022-05-06 ENCOUNTER — HOSPITAL ENCOUNTER (OUTPATIENT)
Dept: ULTRASOUND IMAGING | Age: 52
Discharge: HOME | End: 2022-05-06
Payer: MEDICAID

## 2022-05-06 DIAGNOSIS — K11.8 PAROTID MASS: ICD-10-CM

## 2022-05-06 PROCEDURE — 88173 CYTOPATH EVAL FNA REPORT: CPT

## 2022-05-06 PROCEDURE — 88305 TISSUE EXAM BY PATHOLOGIST: CPT

## 2022-05-06 PROCEDURE — 10005 FNA BX W/US GDN 1ST LES: CPT

## 2022-05-09 ENCOUNTER — TELEPHONE (OUTPATIENT)
Dept: ENT CLINIC | Age: 52
End: 2022-05-09

## 2022-05-09 NOTE — TELEPHONE ENCOUNTER
Pt calling to let Dr Janeth Kirkland know she had the biopsy done on Friday (5/6/22). She is unsure if Dr Janeth Kirkland wants her to schedule an appt for results or if he plans to call her. Please call to advise.

## 2022-05-09 NOTE — TELEPHONE ENCOUNTER
These are not available yet. Tell her I will call her when I get them and we can decide what the next step is.

## 2022-05-12 ENCOUNTER — INITIAL CONSULT (OUTPATIENT)
Dept: VASCULAR SURGERY | Age: 52
End: 2022-05-12
Payer: MEDICAID

## 2022-05-12 VITALS — BODY MASS INDEX: 46.07 KG/M2 | HEIGHT: 61 IN | WEIGHT: 244 LBS

## 2022-05-12 DIAGNOSIS — M79.89 LEG SWELLING: Primary | ICD-10-CM

## 2022-05-12 DIAGNOSIS — I87.2 VENOUS INSUFFICIENCY: ICD-10-CM

## 2022-05-12 PROCEDURE — G8427 DOCREV CUR MEDS BY ELIG CLIN: HCPCS | Performed by: STUDENT IN AN ORGANIZED HEALTH CARE EDUCATION/TRAINING PROGRAM

## 2022-05-12 PROCEDURE — G8417 CALC BMI ABV UP PARAM F/U: HCPCS | Performed by: STUDENT IN AN ORGANIZED HEALTH CARE EDUCATION/TRAINING PROGRAM

## 2022-05-12 PROCEDURE — 1036F TOBACCO NON-USER: CPT | Performed by: STUDENT IN AN ORGANIZED HEALTH CARE EDUCATION/TRAINING PROGRAM

## 2022-05-12 PROCEDURE — 99203 OFFICE O/P NEW LOW 30 MIN: CPT | Performed by: STUDENT IN AN ORGANIZED HEALTH CARE EDUCATION/TRAINING PROGRAM

## 2022-05-12 RX ORDER — LORATADINE 10 MG/1
TABLET ORAL
COMMUNITY
Start: 2022-05-03

## 2022-05-12 ASSESSMENT — ENCOUNTER SYMPTOMS
COLOR CHANGE: 0
SHORTNESS OF BREATH: 0

## 2022-05-12 NOTE — PROGRESS NOTES
Eli Skinner (:  1970) is a 54 y.o. female,New patient, here for evaluation of the following chief complaint(s):  Consultation and Circulatory Problem         ASSESSMENT/PLAN:  1. Leg swelling  2. Venous insufficiency       This is a 54year old female patient who presents for evaluation of right leg rash and concern for stasis dermatitis. She has chronic swelling. Evaluations by duplex in the past have demonstrated no history of thrombosis but evidence of reflux. She has no calf tenderness and no worsening of her leg swelling. No signs to suggest DVT. Would therefore treat the venous insufficiency first with compression; if the rash is related to this it should subside. Recommend covering with aquaphor or vaseline prior to stocking use. If this does not improve then would proceed with reflux testing and possible ablation particularly of right lower extremity. If edema improves and rash still present then she needs referral to derm for evaluation. All questions answered. Subjective   SUBJECTIVE/OBJECTIVE:  This is a 54year old female patient who presents to the office today for evaluation of an unusual rash and itching sensation of her right lower extremity. This has been ongoing for several weeks. She has longstanding history of leg swelling. No stocking use. She states that she elevates when she can. She denies smoking. She denies significant calf pain or tenderness. She states that the rash is tender to touch particularly after itching it. No proximal extension. No joint discomfort. No evidence of rash in other unusual locations. Review of Systems   Constitutional: Negative for chills and fever. Respiratory: Negative for shortness of breath. Cardiovascular: Positive for leg swelling. Negative for chest pain. Musculoskeletal: Negative for arthralgias, joint swelling and myalgias. Skin: Positive for rash. Negative for color change and wound.    Neurological: Negative for weakness and numbness. Hematological: Does not bruise/bleed easily. Psychiatric/Behavioral: Negative for agitation. Objective   Physical Exam  Constitutional:       Appearance: She is obese. Cardiovascular:      Rate and Rhythm: Normal rate and regular rhythm. Pulses: Normal pulses. Pulmonary:      Effort: Pulmonary effort is normal. No respiratory distress. Musculoskeletal:      Right lower leg: Edema present. Left lower leg: Edema present. Skin:     General: Skin is warm and dry. Capillary Refill: Capillary refill takes less than 2 seconds. Comments: Plaque like elevation of distal right leg over ankle mortise. Rash with some excoriation, no drainage, no surrounding erythema. Large distended varicosities over both lower extremities in the calf nearly circumferentially; nontender to touch. Neurological:      General: No focal deficit present. Mental Status: She is alert. Psychiatric:         Mood and Affect: Mood normal.         Behavior: Behavior normal.         Thought Content: Thought content normal.         Judgment: Judgment normal.            On this date 5/12/2022 I have spent 30 minutes reviewing previous notes, test results and face to face with the patient discussing the diagnosis and importance of compliance with the treatment plan as well as documenting on the day of the visit.     Abdoul Coker DO, FSVS, [facility]

## 2022-05-18 ENCOUNTER — TELEPHONE (OUTPATIENT)
Dept: ENT CLINIC | Age: 52
End: 2022-05-18

## 2022-05-18 NOTE — TELEPHONE ENCOUNTER
I called the patient to let her know that the biopsy patient just showed inflammatory process. She still having a little bit of discomfort when she eats, but has not gotten worse. I told her that I think we should get another CAT scan in about 2 months just to make sure that it has not change significantly. If in the meantime is getting worse I like to see her sooner.

## 2022-06-23 ENCOUNTER — HOSPITAL ENCOUNTER (OUTPATIENT)
Dept: WOUND CARE | Age: 52
Discharge: HOME | End: 2022-06-23
Payer: MEDICAID

## 2022-06-23 VITALS
RESPIRATION RATE: 18 BRPM | SYSTOLIC BLOOD PRESSURE: 122 MMHG | BODY MASS INDEX: 46.2 KG/M2 | HEIGHT: 61 IN | HEART RATE: 86 BPM | DIASTOLIC BLOOD PRESSURE: 84 MMHG | WEIGHT: 244.71 LBS | TEMPERATURE: 97.2 F

## 2022-06-23 DIAGNOSIS — L30.9 ACUTE DERMATITIS: Primary | ICD-10-CM

## 2022-06-23 PROCEDURE — 99213 OFFICE O/P EST LOW 20 MIN: CPT

## 2022-06-23 RX ORDER — DOXYCYCLINE HYCLATE 100 MG
100 TABLET ORAL 2 TIMES DAILY
Qty: 20 TABLET | Refills: 0 | Status: SHIPPED | OUTPATIENT
Start: 2022-06-23 | End: 2022-07-03

## 2022-06-23 RX ORDER — LIDOCAINE 50 MG/G
OINTMENT TOPICAL ONCE
Status: CANCELLED | OUTPATIENT
Start: 2022-06-23 | End: 2022-06-23

## 2022-06-23 RX ORDER — KETOCONAZOLE 20 MG/G
CREAM TOPICAL
Qty: 60 G | Refills: 5 | Status: SHIPPED | OUTPATIENT
Start: 2022-06-23

## 2022-06-23 RX ORDER — LIDOCAINE HYDROCHLORIDE 20 MG/ML
JELLY TOPICAL ONCE
Status: CANCELLED | OUTPATIENT
Start: 2022-06-23 | End: 2022-06-23

## 2022-06-23 RX ORDER — CLOBETASOL PROPIONATE 0.5 MG/G
OINTMENT TOPICAL ONCE
Status: CANCELLED | OUTPATIENT
Start: 2022-06-23 | End: 2022-06-23

## 2022-06-23 RX ORDER — BETAMETHASONE DIPROPIONATE 0.05 %
OINTMENT (GRAM) TOPICAL ONCE
Status: CANCELLED | OUTPATIENT
Start: 2022-06-23 | End: 2022-06-23

## 2022-06-23 RX ORDER — LIDOCAINE 40 MG/G
CREAM TOPICAL ONCE
Status: CANCELLED | OUTPATIENT
Start: 2022-06-23 | End: 2022-06-23

## 2022-06-23 RX ORDER — LIDOCAINE HYDROCHLORIDE 40 MG/ML
SOLUTION TOPICAL ONCE
Status: CANCELLED | OUTPATIENT
Start: 2022-06-23 | End: 2022-06-23

## 2022-06-23 RX ORDER — LIDOCAINE HYDROCHLORIDE 40 MG/ML
SOLUTION TOPICAL ONCE
Status: COMPLETED | OUTPATIENT
Start: 2022-06-23 | End: 2022-06-23

## 2022-06-23 RX ORDER — GINSENG 100 MG
CAPSULE ORAL ONCE
Status: CANCELLED | OUTPATIENT
Start: 2022-06-23 | End: 2022-06-23

## 2022-06-23 RX ORDER — BACITRACIN ZINC AND POLYMYXIN B SULFATE 500; 1000 [USP'U]/G; [USP'U]/G
OINTMENT TOPICAL ONCE
Status: CANCELLED | OUTPATIENT
Start: 2022-06-23 | End: 2022-06-23

## 2022-06-23 RX ORDER — BACITRACIN, NEOMYCIN, POLYMYXIN B 400; 3.5; 5 [USP'U]/G; MG/G; [USP'U]/G
OINTMENT TOPICAL ONCE
Status: CANCELLED | OUTPATIENT
Start: 2022-06-23 | End: 2022-06-23

## 2022-06-23 RX ORDER — GENTAMICIN SULFATE 1 MG/G
OINTMENT TOPICAL ONCE
Status: CANCELLED | OUTPATIENT
Start: 2022-06-23 | End: 2022-06-23

## 2022-06-23 RX ORDER — CYCLOBENZAPRINE HCL 10 MG
10 TABLET ORAL 3 TIMES DAILY PRN
COMMUNITY

## 2022-06-23 RX ADMIN — LIDOCAINE HYDROCHLORIDE: 40 SOLUTION TOPICAL at 09:09

## 2022-06-23 ASSESSMENT — PAIN SCALES - GENERAL: PAINLEVEL_OUTOF10: 0

## 2022-06-23 NOTE — LETTER
[address]  [address] OFFICE [place] [address] [facility]  [phone]  Dept: [phone]   TODAYS DATE: 6/17/2022    [address] [place] [address],4Th Floor Wound Care   Appointment Treatment Guidelines  Welcome Ms. Elizabeth Driver to the [address] [place] [address]. We appreciate the confidence you have shown in choosing us as your wound care provider. Our goal is to heal your wound(s) as quickly as possible. Please read the items below regarding the nature of your appointments. 1. We will make every effort to schedule appointments that are convenient for you. Certain days and times may not be available, depending on your providers office hours and details of your care. 2. Patients will not necessarily be brought to an exam room in the order in which they arrive. Many providers work out of this office and patients are here for different procedures. Our goal is to serve you as quickly as possible. 3. We acknowledge that your time is valuable. Please remember that wound healing takes time and we appreciate your understanding that the length of each patients appointment will vary depending upon their need. 4. It is for your protection that we ask for insurance cards, photo ID, and new consent forms on your first visit and periodically throughout your treatment at all our facilities. 5. Wound Care treatment is known to be most effective when provided on a regular basis. Missed appointments, and not following the recommended plan of care can result in ineffective treatment and a poor outcome. If you find it difficult to keep appointments or to follow the recommended plan of care, it is your responsibility to let the staff know, so that we can work with you toward a solution. 6. If you need to miss an appointment, please call to let us know. We expect 24 hours notice for all cancellations.  We also expect missed visits to be rescheduled as soon as possible, preferably within the same week to promote the most effective healing time for your wound(s). 7. If you will be late for an appointment, please call our center to be sure that the provider can still see you when you arrive. If you are more than 15 minutes late your appointment may need to be rescheduled. 8. If two (2) appointments are missed without notifying us, your care plan may be discontinued. The same may happen if multiple visits are cancelled or rescheduled, even with notice. A missed visit is time when another patient, who also needs care, could have been seen. Thank you for your understanding and consideration.

## 2022-06-23 NOTE — LETTER
[address]  [address] [facility]  [phone]  Dept: [phone]        Thank you Ms. Darby Moctezuma for choosing [facility] for your Wound Care needs. We hope you found your first visit both encouraging and educational.  We look forward to serving you throughout the healing process. Before your next visit please review the information you received in your [employer]. The first visit can be overwhelming and this is a useful tool to refresh what information you may have been given. If you find yourself with any questions prior to your upcoming appointment, please feel free to contact us. Often wounds can be challenging and it is our goal to see you through the healing process with as much support possible. Again, thank you for choosing [facility]!     Sincerely,      The Staff of [address] [place] [address] Pkwy and Hyperbaric Oxygen Therapy

## 2022-06-23 NOTE — PROGRESS NOTES
[address]   Progress Note and Procedure Note      [facility] RECORD NUMBER:  [id number]  AGE: 54 y.o. GENDER: female  : 1970  EPISODE DATE:  2022    Subjective:     Chief Complaint   Patient presents with    Wound Check     Initial Visit on Right Lower Leg; Patient stated that the wound has been there for 3 months and has been using vaseline to it; Dr. Layne Mota referred her here         HISTORY of PRESENT ILLNESS HPI     Amelia Cm is a 54 y.o. female who presents today for wound/ulcer evaluation. History of Wound Context: Presents today for a \"sore\" on her right anterior dorsal foot and ankle. She relates it has been there for over 3 months. She has had treatment by her primary care physician as well as Dr. Belkis Garcia. She has used both a topical steroid and emollients without any improvement in her symptoms. She states the rash itches. She denies any constitutional symptoms or warmth associated with the lesion. She denies any trauma to the area. She denies any history of a burn or a splash of chemicals to the area. Wound/Ulcer Pain Timing/Severity: mild, patient relates it is more itchy than painful. Quality of pain: N/A  Severity:  0 / 10   Modifying Factors: None  Associated Signs/Symptoms: edema    Ulcer Identification:  Ulcer Type: Dermatitis    Contributing Factors: edema and venous stasis    Acute Wound: N/A    PAST MEDICAL HISTORY        Diagnosis Date    Arthritis     Back pain     Headache        PAST SURGICAL HISTORY    Past Surgical History:   Procedure Laterality Date     SECTION         FAMILY HISTORY    History reviewed. No pertinent family history.     SOCIAL HISTORY    Social History     Tobacco Use    Smoking status: Never Smoker    Smokeless tobacco: Never Used   Substance Use Topics    Alcohol use: No    Drug use: No       ALLERGIES    Allergies   Allergen Reactions    Amoxicillin     Penicillins        MEDICATIONS    Current Outpatient Medications on File Prior to Encounter   Medication Sig Dispense Refill    cyclobenzaprine (FLEXERIL) 10 MG tablet Take 10 mg by mouth 3 times daily as needed for Muscle spasms      loratadine (CLARITIN) 10 MG tablet TAKE 1 TABLET EVERY DAY BY ORAL ROUTE AS NEEDED.  naproxen (NAPROSYN) 500 MG tablet Take 1 tablet by mouth 2 times daily (with meals) 60 tablet 5    ibuprofen (ADVIL;MOTRIN) 600 MG tablet Take 1 tablet by mouth every 8 hours as needed for Pain (Headache) 20 tablet 0     No current facility-administered medications on file prior to encounter. REVIEW OF SYSTEMS  Review of Systems    Pertinent items are noted in HPI. Review of Systems: A 12 point review of symptoms is unremarkable with the exception of the chief complaint. Patient specifically denies nausea, fever, vomiting, chills, shortness of breath, chest pain, abdominal pain, constipation or difficulty urinating. Objective:      /84   Pulse 86   Temp 97.2 °F (36.2 °C) (Temporal)   Resp 18   Ht 5' 1\" (1.549 m)   Wt 244 lb 11.4 oz (111 kg)   BMI 46.24 kg/m²     Wt Readings from Last 3 Encounters:   06/23/22 244 lb 11.4 oz (111 kg)   05/12/22 244 lb (110.7 kg)   04/04/22 246 lb (111.6 kg)       PHYSICAL EXAM  Physical Exam    Patient has a palpable dorsalis pedis and posterior tibial pulse bilaterally. She has a normal hair growth pattern on her bilateral lower extremities. Capillary fill time is brisk to all digits. Patient has extensive varicose veins noted on her bilateral foot and legs. There is moderate nonpitting edema noted on the bilateral lower extremities. There is a large plaque noted that is erythematous over the anterior lateral aspect of the right foot and ankle. There are dried crusty likely ruptured vesicles noted at the periphery of the plaque. There is no fluctuance or crepitus associated with this.   The area is not warm to the touch. Patient's muscle strength is intact and symmetrical for dorsiflexors plantar flexors inverters and everters bilaterally. Patient has a mild to moderate bunion deformity noted bilaterally      Assessment:        Problem List Items Addressed This Visit     Acute dermatitis    Relevant Medications    ketoconazole (NIZORAL) 2 % cream                 Plan:   E/M x30 minutes of a new problem of dermatitis right anterior ankle. Rx ketoconazole cream as patient has not had any treatment for a fungal component. Rx doxycycline 100 mg #21 p.o. twice daily. There is some calloway crusty drainage noted at the periphery of the lesion that could indicate a staph infection. Patient has significant venous disease. We will control her edema with spandigrips. Treatment Note please see attached Discharge Instructions    Written patient dismissal instructions given to patient and signed by patient or POA. Reappoint 2 weeks for follow-up    Discharge Instructions       [address] and Hyper[facility]ic Oxygen Therapy   Physician Orders and Discharge Instructions  [address]   [address] [place] [address]  Telephone: [phone]    NAME:  Bernardo Morrison OF BIRTH:  1970  MEDICAL RECORD NUMBER:  [id number]  DATE:  6/23/2022    Wound Cleansing:   Do not scrub or use excessive force. Cleanse wound prior to applying a clean dressing with:  [] Normal Saline  [] Keep Wound Dry in Shower    [] Wound Cleanser   [] Cleanse wound with Mild Soap & Water  [] May Shower at Discharge   [] Other:       Topical Treatments:  Do not apply lotions, creams, or ointments to wound bed unless directed.    [] Apply moisturizing lotion to skin surrounding the wound prior to dressing change.  [] Apply antifungal ointment to skin surrounding the wound prior to dressing change.  [] Apply thin film of moisture barrier ointment to skin immediately around wound. [] Other:       Dressings:           Wound Location : RIGHT LOWER LEG   [x] Apply Primary Dressing:       [x] Other:  APPLY KETOCONAZOLE ( ANTIFUNGAL CREAM - SCRIPT SENT TO PHARMACY) TWICE PER DAY TO RED AREA  [] Cover and Secure with:     [] Gauze [] Ghazal [] Roll gauze   [] Ace Wrap [] Cover Roll Tape [] ABD     [] Other:    Avoid contact of tape with skin. [] Change dressing: [] Daily    [] Every Other Day [] Three times per week   [] Once a week [] Do Not Change Dressing   [] Other:         Edema Control:  Apply: [] Compression Stocking []Right Leg []Left Leg   [] Tubigrip []Right Leg Double Layer []Left Leg Double Layer       []Right Leg Single Layer []Left Leg Single Layer   [x] SpandaGrip [x]Right Leg  []Left Leg      []Low compression 5-10 mm/Hg      [x]Medium compression 10-20 mm/Hg     []High compression  20-30 mm/Hg   every morning immediately when getting up should be applied to affected leg(s) from mid foot to knee making sure to cover the heel. Remove every night before going to bed. [x] Elevate leg(s) above the level of the heart when sitting. [x] Avoid prolonged standing in one place. Compression:  Apply: [] Multilayer Compression Wrap Applied in Clinic []RightLeg []Left Leg   [] Multi-layer compression. Do not get leg(s) with wrap wet. If wraps become too tight call the center or completely remove the wrap. [] Elevate leg(s) above the level of the heart when sitting. [] Avoid prolonged standing in one place.     Off-Loading:   [] Off-loading when [] walking  [] in bed [] sitting  [] Total non-weight bearing  [] Right Leg  [] Left Leg   [] Assistive Device [] Walker [] Cane  [] Wheelchair  [] Crutches   [] Surgical shoe    [] Podus Boot(s)   [] Foam Boot(s)  [] Roll About    [] Cast Boot [] CROW Boot  [] Other:    .      Dietary:  [x] Diet as tolerated:   [] Calorie Diabetic Diet:   [] No Added Salt:  [] Increase Protein:   [] Other:     Activity:  [x]

## 2022-06-23 NOTE — PLAN OF CARE
Patient Name:  Will Finley  YOB: 1970  Today's Date:  June 23, 2022  Medical Record Number:  [id number]  Provider:    [address] [place] [address] Pkwy   Appointment Treatment Guidelines        The [address] [place] [address] Pkwy Appointment Treatment Guidelines were reviewed on June 23, 2022 with the patient. Ms. Yao Housecoreenmaninder understanding of the [address] [place] [address] Pkwy Appointment Treatment Guidelines.       Electronically signed by Ximena Moore RN on 6/23/22 at 9:16 AM EDT

## 2022-07-07 ENCOUNTER — HOSPITAL ENCOUNTER (OUTPATIENT)
Dept: WOUND CARE | Age: 52
Discharge: HOME | End: 2022-07-07
Payer: MEDICAID

## 2022-07-07 VITALS
RESPIRATION RATE: 18 BRPM | DIASTOLIC BLOOD PRESSURE: 74 MMHG | SYSTOLIC BLOOD PRESSURE: 103 MMHG | HEART RATE: 80 BPM | TEMPERATURE: 97.3 F

## 2022-07-07 DIAGNOSIS — L30.9 ACUTE DERMATITIS: Primary | ICD-10-CM

## 2022-07-07 PROCEDURE — 88305 TISSUE EXAM BY PATHOLOGIST: CPT

## 2022-07-07 PROCEDURE — 87176 TISSUE HOMOGENIZATION CULTR: CPT

## 2022-07-07 PROCEDURE — 88312 SPECIAL STAINS GROUP 1: CPT

## 2022-07-07 PROCEDURE — 11104 PUNCH BX SKIN SINGLE LESION: CPT

## 2022-07-07 PROCEDURE — 87205 SMEAR GRAM STAIN: CPT

## 2022-07-07 PROCEDURE — 87070 CULTURE OTHR SPECIMN AEROBIC: CPT

## 2022-07-07 RX ORDER — LIDOCAINE 50 MG/G
OINTMENT TOPICAL ONCE
Status: CANCELLED | OUTPATIENT
Start: 2022-07-07 | End: 2022-07-07

## 2022-07-07 RX ORDER — LIDOCAINE HYDROCHLORIDE 40 MG/ML
SOLUTION TOPICAL ONCE
Status: COMPLETED | OUTPATIENT
Start: 2022-07-07 | End: 2022-07-07

## 2022-07-07 RX ORDER — LIDOCAINE HYDROCHLORIDE 20 MG/ML
JELLY TOPICAL ONCE
Status: CANCELLED | OUTPATIENT
Start: 2022-07-07 | End: 2022-07-07

## 2022-07-07 RX ORDER — LIDOCAINE HYDROCHLORIDE 40 MG/ML
SOLUTION TOPICAL ONCE
Status: CANCELLED | OUTPATIENT
Start: 2022-07-07 | End: 2022-07-07

## 2022-07-07 RX ORDER — BETAMETHASONE DIPROPIONATE 0.05 %
OINTMENT (GRAM) TOPICAL ONCE
Status: CANCELLED | OUTPATIENT
Start: 2022-07-07 | End: 2022-07-07

## 2022-07-07 RX ORDER — GINSENG 100 MG
CAPSULE ORAL ONCE
Status: CANCELLED | OUTPATIENT
Start: 2022-07-07 | End: 2022-07-07

## 2022-07-07 RX ORDER — BACITRACIN, NEOMYCIN, POLYMYXIN B 400; 3.5; 5 [USP'U]/G; MG/G; [USP'U]/G
OINTMENT TOPICAL ONCE
Status: CANCELLED | OUTPATIENT
Start: 2022-07-07 | End: 2022-07-07

## 2022-07-07 RX ORDER — BACITRACIN ZINC AND POLYMYXIN B SULFATE 500; 1000 [USP'U]/G; [USP'U]/G
OINTMENT TOPICAL ONCE
Status: CANCELLED | OUTPATIENT
Start: 2022-07-07 | End: 2022-07-07

## 2022-07-07 RX ORDER — CLOBETASOL PROPIONATE 0.5 MG/G
OINTMENT TOPICAL ONCE
Status: CANCELLED | OUTPATIENT
Start: 2022-07-07 | End: 2022-07-07

## 2022-07-07 RX ORDER — GENTAMICIN SULFATE 1 MG/G
OINTMENT TOPICAL ONCE
Status: CANCELLED | OUTPATIENT
Start: 2022-07-07 | End: 2022-07-07

## 2022-07-07 RX ORDER — LIDOCAINE HYDROCHLORIDE 20 MG/ML
5 INJECTION, SOLUTION INFILTRATION; PERINEURAL ONCE
Status: DISCONTINUED | OUTPATIENT
Start: 2022-07-07 | End: 2022-07-08 | Stop reason: HOSPADM

## 2022-07-07 RX ORDER — LIDOCAINE 40 MG/G
CREAM TOPICAL ONCE
Status: CANCELLED | OUTPATIENT
Start: 2022-07-07 | End: 2022-07-07

## 2022-07-07 RX ADMIN — LIDOCAINE HYDROCHLORIDE: 40 SOLUTION TOPICAL at 09:16

## 2022-07-07 ASSESSMENT — PAIN SCALES - GENERAL
PAINLEVEL_OUTOF10: 0
PAINLEVEL_OUTOF10: 0

## 2022-07-07 NOTE — PROGRESS NOTES
[address]   Progress Note and Procedure Note      [facility] RECORD NUMBER:  [id number]  AGE: 54 y.o. GENDER: female  : 1970  EPISODE DATE:  2022    Subjective:     Chief Complaint   Patient presents with    Wound Check     Follow up for right lower leg wound. HISTORY of PRESENT ILLNESS HPI     Prince Dawkins is a 54 y.o. female who presents today for wound/ulcer evaluation. History of Wound Context: Presents today for a \"sore\" on her right anterior dorsal foot and ankle. She relates it has been there for over 3 months. She has had treatment by her primary care physician as well as Dr. Theo Bailey. She has used both a topical steroid and emollients without any improvement in her symptoms. She states the rash itches. She denies any constitutional symptoms or warmth associated with the lesion. She denies any trauma to the area. She denies any history of a burn or a splash of chemicals to the area. She relates that she has been using the ketoconazole cream but has not yet finished taking her antibiotics. Wound/Ulcer Pain Timing/Severity: mild, patient relates it is more itchy than painful. Quality of pain: N/A  Severity:  0 / 10   Modifying Factors: None  Associated Signs/Symptoms: edema    Ulcer Identification:  Ulcer Type: Dermatitis    Contributing Factors: edema and venous stasis    Acute Wound: N/A    PAST MEDICAL HISTORY        Diagnosis Date    Arthritis     Back pain     Headache        PAST SURGICAL HISTORY    Past Surgical History:   Procedure Laterality Date     SECTION         FAMILY HISTORY    History reviewed. No pertinent family history.     SOCIAL HISTORY    Social History     Tobacco Use    Smoking status: Never Smoker    Smokeless tobacco: Never Used   Substance Use Topics    Alcohol use: No    Drug use: No       ALLERGIES    Allergies   Allergen Reactions    Amoxicillin     Penicillins MEDICATIONS    Current Outpatient Medications on File Prior to Encounter   Medication Sig Dispense Refill    cyclobenzaprine (FLEXERIL) 10 MG tablet Take 10 mg by mouth 3 times daily as needed for Muscle spasms      ketoconazole (NIZORAL) 2 % cream Apply topically bid to right foot lesion. 60 g 5    loratadine (CLARITIN) 10 MG tablet TAKE 1 TABLET EVERY DAY BY ORAL ROUTE AS NEEDED.  naproxen (NAPROSYN) 500 MG tablet Take 1 tablet by mouth 2 times daily (with meals) 60 tablet 5    ibuprofen (ADVIL;MOTRIN) 600 MG tablet Take 1 tablet by mouth every 8 hours as needed for Pain (Headache) 20 tablet 0     No current facility-administered medications on file prior to encounter. REVIEW OF SYSTEMS  Review of Systems    Pertinent items are noted in HPI. Review of Systems: A 12 point review of symptoms is unremarkable with the exception of the chief complaint. Patient specifically denies nausea, fever, vomiting, chills, shortness of breath, chest pain, abdominal pain, constipation or difficulty urinating. Objective:      /74   Pulse 80   Temp 97.3 °F (36.3 °C) (Infrared)   Resp 18     Wt Readings from Last 3 Encounters:   06/23/22 244 lb 11.4 oz (111 kg)   05/12/22 244 lb (110.7 kg)   04/04/22 246 lb (111.6 kg)       PHYSICAL EXAM  Physical Exam    Patient has a palpable dorsalis pedis and posterior tibial pulse bilaterally. She has a normal hair growth pattern on her bilateral lower extremities. Capillary fill time is brisk to all digits. Patient has extensive varicose veins noted on her bilateral foot and legs. There is moderate nonpitting edema noted on the bilateral lower extremities. There is a large plaque noted that is erythematous over the anterior lateral aspect of the right foot and ankle. There are dried crusty likely ruptured vesicles noted at the periphery of the plaque. There is no fluctuance or crepitus associated with this. The area is not warm to the touch.   The area is unchanged from previous examination, no better but no worse. Patient's muscle strength is intact and symmetrical for dorsiflexors plantar flexors inverters and everters bilaterally.   Patient has a mild to moderate bunion deformity noted bilaterally      Procedure  Biopsy Note    Biopsy Type: Punch Biopsy of skin single lesion    Performed by: Triny Siddiqui DPM    Consent obtained: Yes    Time out taken: Yes    Pain Control: Anesthetic: 2% Lidocaine Injectable (5ML PER DR JEWEL)    Location of Biopsy:  Wound/Ulcer Number(s)  Wound/Ulcer # 1    Wound 06/23/22 Leg Right;Lateral;Lower #1 Noted 2/1/22 (Active)   Wound Image   06/23/22 0903   Wound Etiology Other 06/23/22 0935   Dressing Status Old drainage noted 06/23/22 0903   Dressing/Treatment Other (comment) 07/07/22 0954   Wound Length (cm) 7 cm 07/07/22 0911   Wound Width (cm) 7.5 cm 07/07/22 0911   Wound Depth (cm) 0.1 cm 07/07/22 0911   Wound Surface Area (cm^2) 52.5 cm^2 07/07/22 0911   Change in Wound Size % (l*w) 24.24 07/07/22 0911   Wound Volume (cm^3) 5.25 cm^3 07/07/22 0911   Wound Healing % 24 07/07/22 0911   Post-Procedure Length (cm) 7.7 cm 06/23/22 0935   Post-Procedure Width (cm) 9 cm 06/23/22 0935   Post-Procedure Depth (cm) 0.1 cm 06/23/22 0935   Post-Procedure Surface Area (cm^2) 69.3 cm^2 06/23/22 0935   Post-Procedure Volume (cm^3) 6.93 cm^3 06/23/22 0935   Wound Assessment Granulation tissue;Slough 07/07/22 0911   Drainage Amount Small 06/23/22 0903   Drainage Description Serosanguinous 06/23/22 0903   Odor None 07/07/22 0911   Sarah-wound Assessment Dry/flaky 07/07/22 0911   Margins Undefined edges 07/07/22 0911   Wound Thickness Description not for Pressure Injury Full thickness 07/07/22 0911   Number of days: 14          Instruments used to perform Biopsy: 3 mm dermal skin punch, forceps and scissors    Specimen sent to Pathology:Yes    Total number of Biopsy Specimen: 4, 1 was sent to micro and the other 3 were sent to pathology Estimated Blood Loss: with a moderate amount of bleeding    Hemostasis Achieved:by pressure and by silver nitrate stick    Procedural Pain:0  / 10     Post Procedural Pain:0 / 10     Response to treatment: Patient tolerated procedure well with no complaints of pain    Assessment:        Problem List Items Addressed This Visit     Acute dermatitis - Primary    Relevant Orders    Initiate Outpatient Wound Care Protocol              Plan:   E/M x30 minutes of a new problem of dermatitis right anterior ankle. A punch biopsy was performed today due to nonhealing ulceration please refer to the above dictation for details and findings. A multilayer compression dressing was applied to the patient's right lower extremity. She was instructed to keep it clean dry and intact. Patient has significant venous disease. We will control her edema with spandigrips. Treatment Note please see attached Discharge Instructions    Written patient dismissal instructions given to patient and signed by patient or POA. Reappoint 1 weeks for follow-up    Discharge Instructions       [address] and [facility]   Physician Orders and Discharge Instructions  [address]   [address] [place] [address]  Telephone: [phone]     NAME:  Otis Townsend OF BIRTH:  1970  MEDICAL RECORD NUMBER:  [id number]  DATE:  7/7/2022     Wound Cleansing:   Do not scrub or use excessive force. Cleanse wound prior to applying a clean dressing with:  []? Normal Saline  []? Keep Wound Dry in Shower    []? Wound Cleanser   []? Cleanse wound with Mild Soap & Water  []? May Shower at Discharge   []? Other:        Topical Treatments:  Do not apply lotions, creams, or ointments to wound bed unless directed. []? Apply moisturizing lotion to skin surrounding the wound prior to dressing change. []?  Apply antifungal ointment to skin surrounding the wound prior to dressing change. []? Apply thin film of moisture barrier ointment to skin immediately around wound. []? Other:                  Dressings:                  Wound Location : RIGHT LOWER LEG   [x]? Apply Primary Dressing:                                          [x]? Other:  APPLY BETAMETHASONE TODAY AT [facility]  [x]? Cover and Secure with:                   []? Gauze         []? Deliah Wil[place]           []? Roll gauze              []? Ace Wrap   []? Cover Roll Tape     []? ABD                                      [x]? Other: DRAWTEX AND OPTILOCK              Avoid contact of tape with skin. []? Change dressing:   []? Daily           []? Every Other Day    []? Three times per week              []? Once a week          [x]? Do Not Change Dressing     []? Other:                      Edema Control:  Apply:  []? Compression Stocking       []? Right Leg     []? Left Leg              []? Tubigrip      []? Right Leg Double Layer      []? Left Leg Double Layer                                                  []?Right Leg Single Layer       []? Left Leg Single Layer              []? SpandaGrip            []? Right Leg     []? Left Leg                                      []?Low compression 5-10 mm/Hg                                             []?Medium compression 10-20 mm/Hg                                      []?High compression  20-30 mm/Hg              every morning immediately when getting up should be applied to affected leg(s) from mid foot to knee making sure to cover the heel. Remove every night before going to bed. [x]? Elevate leg(s) above the level of the heart when sitting. [x]? Avoid prolonged standing in one place.                   Compression: COBAN 2   Apply:  [x]? Multilayer Compression Wrap Applied in Clinic    [x]? RightLeg      []? Left Leg              [x]? Multi-layer compression. Do not get leg(s) with wrap wet.   If wraps become too tight call the center or completely remove the wrap. [x]? Elevate leg(s) above the level of the heart when sitting. [x]? Avoid prolonged standing in one place.     Off-Loading:   []? Off-loading when    []? walking       []? in bed         []? sitting  []? Total non-weight bearing  []? Right Leg  []? Left Leg          []? Assistive Device     []? Mantee Else        []? Cane           []? Wheelchair  []? Crutches              []? Surgical shoe    []? Podus Boot(s)   []? Foam Boot(s)  []? Roll About              []? Cast Boot   []? CROW Boot  []? Other:     .                Dietary:  [x]? Diet as tolerated:     []? Calorie Diabetic Diet:           []? No Added Salt:  []? Increase Protein:     []? Other:                Activity:  [x]? Activity as tolerated:  []? Patient has no activity restrictions     []? Strict Bedrest: []? Remain off Work:     []? May return to full duty work:                                    []? Return to work with restrictions:                 If you are still having pain after you go home:  []? Elevate the affected limb. []? Use over-the-counter medications you would normally use for pain as permitted by your family doctor. []? For persistent pain not relieved by the above interventions, please call your family doctor.   Bobby Dodson   Return Appointment:  []? Wound and dressing supply provider:   []? ECF or Home Healthcare:  []? Wound Assessment:          []? Physician or NP scheduled for Wound Assessment:   [x]? Return Appointment: With DR HOLLOWAY  in 1 Week(s)  [x]?  Ordered tests: RIGHT LEG BIOPSY AND CULTURE OBTAINED TODAY     **START DOXYCYCYCLINE 100MG - TAKE ONE TABLET TWICE PER DAY FOR 10 DAYS ( SCRIPT SENT TO PHARMACY )**     Nurse Case Manger:  [facility]     Electronically signed by Shaila Talley RN on 7/7/2022 at 9:38 AM    [address] [place] [address] Information: Should you experience any significant changes in your wound(s) or have questions about your wound care, please contact the [address] [place] [address] at [address] 8:00 am - 4:30 pm and Friday 8:00 am - 12:30 pm.  If you need help with your wound outside these hours and cannot wait until we are again available, contact your PCP or go to the hospital emergency room.      PLEASE NOTE: IF YOU ARE UNABLE TO OBTAIN WOUND SUPPLIES, CONTINUE TO USE THE SUPPLIES YOU HAVE AVAILABLE UNTIL YOU ARE ABLE TO [facility]. IT IS MOST IMPORTANT TO KEEP THE WOUND COVERED AT ALL TIMES.      Physician Signature:_______________________     Date: ___________ Time:  ____________                                Dr Nithin Tavera         Electronically signed by Nithin Tavera DPM on 7/7/2022 at 6:44 PM

## 2022-07-12 LAB
ANAEROBIC CULTURE: NORMAL
GRAM STAIN RESULT: NORMAL
WOUND/ABSCESS: NORMAL

## 2022-07-14 ENCOUNTER — HOSPITAL ENCOUNTER (OUTPATIENT)
Dept: WOUND CARE | Age: 52
Discharge: HOME | End: 2022-07-14
Payer: MEDICAID

## 2022-07-14 VITALS
RESPIRATION RATE: 18 BRPM | DIASTOLIC BLOOD PRESSURE: 68 MMHG | HEART RATE: 67 BPM | SYSTOLIC BLOOD PRESSURE: 120 MMHG | TEMPERATURE: 97.8 F

## 2022-07-14 DIAGNOSIS — L30.9 ACUTE DERMATITIS: Primary | ICD-10-CM

## 2022-07-14 PROCEDURE — 11042 DBRDMT SUBQ TIS 1ST 20SQCM/<: CPT

## 2022-07-14 RX ORDER — BACITRACIN, NEOMYCIN, POLYMYXIN B 400; 3.5; 5 [USP'U]/G; MG/G; [USP'U]/G
OINTMENT TOPICAL ONCE
Status: CANCELLED | OUTPATIENT
Start: 2022-07-14 | End: 2022-07-14

## 2022-07-14 RX ORDER — GINSENG 100 MG
CAPSULE ORAL ONCE
Status: CANCELLED | OUTPATIENT
Start: 2022-07-14 | End: 2022-07-14

## 2022-07-14 RX ORDER — CLOBETASOL PROPIONATE 0.5 MG/G
OINTMENT TOPICAL ONCE
Status: CANCELLED | OUTPATIENT
Start: 2022-07-14 | End: 2022-07-14

## 2022-07-14 RX ORDER — GENTAMICIN SULFATE 1 MG/G
OINTMENT TOPICAL ONCE
Status: CANCELLED | OUTPATIENT
Start: 2022-07-14 | End: 2022-07-14

## 2022-07-14 RX ORDER — LIDOCAINE HYDROCHLORIDE 40 MG/ML
SOLUTION TOPICAL ONCE
Status: COMPLETED | OUTPATIENT
Start: 2022-07-14 | End: 2022-07-14

## 2022-07-14 RX ORDER — LIDOCAINE 40 MG/G
CREAM TOPICAL ONCE
Status: CANCELLED | OUTPATIENT
Start: 2022-07-14 | End: 2022-07-14

## 2022-07-14 RX ORDER — LIDOCAINE 50 MG/G
OINTMENT TOPICAL ONCE
Status: CANCELLED | OUTPATIENT
Start: 2022-07-14 | End: 2022-07-14

## 2022-07-14 RX ORDER — LIDOCAINE HYDROCHLORIDE 40 MG/ML
SOLUTION TOPICAL ONCE
Status: CANCELLED | OUTPATIENT
Start: 2022-07-14 | End: 2022-07-14

## 2022-07-14 RX ORDER — LIDOCAINE HYDROCHLORIDE 20 MG/ML
JELLY TOPICAL ONCE
Status: CANCELLED | OUTPATIENT
Start: 2022-07-14 | End: 2022-07-14

## 2022-07-14 RX ORDER — BETAMETHASONE DIPROPIONATE 0.05 %
OINTMENT (GRAM) TOPICAL ONCE
Status: CANCELLED | OUTPATIENT
Start: 2022-07-14 | End: 2022-07-14

## 2022-07-14 RX ORDER — BACITRACIN ZINC AND POLYMYXIN B SULFATE 500; 1000 [USP'U]/G; [USP'U]/G
OINTMENT TOPICAL ONCE
Status: CANCELLED | OUTPATIENT
Start: 2022-07-14 | End: 2022-07-14

## 2022-07-14 RX ADMIN — LIDOCAINE HYDROCHLORIDE: 40 SOLUTION TOPICAL at 09:08

## 2022-07-14 ASSESSMENT — PAIN SCALES - GENERAL: PAINLEVEL_OUTOF10: 0

## 2022-07-14 NOTE — PROGRESS NOTES
[address]   Progress Note and Procedure Note      [facility] RECORD NUMBER:  [id number]  AGE: 54 y.o. GENDER: female  : 1970  EPISODE DATE:  2022    Subjective:     Chief Complaint   Patient presents with    Wound Check     Follow up for lower leg wound. HISTORY of PRESENT ILLNESS HPI     Landon Hodges is a 54 y.o. female who presents today for wound/ulcer evaluation. History of Wound Context: Presents today for a \"sore\" on her right anterior dorsal foot and ankle. She relates it has been there for over 3 months. She has had treatment by her primary care physician as well as Dr. Jona Kinsey. She has used both a topical steroid and emollients without any improvement in her symptoms. She states the rash itches. She denies any constitutional symptoms or warmth associated with the lesion. She denies any trauma to the area. She denies any history of a burn or a splash of chemicals to the area. She relates that she has been using the ketoconazole cream but has not yet finished taking her antibiWound/Ulcer Pain Timing/Severity: mild, patient relates it is more itchy than painful. She has tolerated her compression dressing well. Quality of pain: N/A  Severity:  0 / 10   Modifying Factors: None  Associated Signs/Symptoms: edema    Ulcer Identification:  Ulcer Type: Dermatitis    Contributing Factors: edema and venous stasis    Acute Wound: N/A    PAST MEDICAL HISTORY        Diagnosis Date    Arthritis     Back pain     Headache        PAST SURGICAL HISTORY    Past Surgical History:   Procedure Laterality Date     SECTION         FAMILY HISTORY    History reviewed. No pertinent family history.     SOCIAL HISTORY    Social History     Tobacco Use    Smoking status: Never Smoker    Smokeless tobacco: Never Used   Substance Use Topics    Alcohol use: No    Drug use: No       ALLERGIES    Allergies   Allergen Reactions  Amoxicillin     Penicillins        MEDICATIONS    Current Outpatient Medications on File Prior to Encounter   Medication Sig Dispense Refill    cyclobenzaprine (FLEXERIL) 10 MG tablet Take 10 mg by mouth 3 times daily as needed for Muscle spasms      ketoconazole (NIZORAL) 2 % cream Apply topically bid to right foot lesion. 60 g 5    loratadine (CLARITIN) 10 MG tablet TAKE 1 TABLET EVERY DAY BY ORAL ROUTE AS NEEDED.  naproxen (NAPROSYN) 500 MG tablet Take 1 tablet by mouth 2 times daily (with meals) 60 tablet 5    ibuprofen (ADVIL;MOTRIN) 600 MG tablet Take 1 tablet by mouth every 8 hours as needed for Pain (Headache) 20 tablet 0     No current facility-administered medications on file prior to encounter. REVIEW OF SYSTEMS  Review of Systems    Pertinent items are noted in HPI. Review of Systems: A 12 point review of symptoms is unremarkable with the exception of the chief complaint. Patient specifically denies nausea, fever, vomiting, chills, shortness of breath, chest pain, abdominal pain, constipation or difficulty urinating. Objective:      /68   Pulse 67   Temp 97.8 °F (36.6 °C) (Infrared)   Resp 18     Wt Readings from Last 3 Encounters:   06/23/22 244 lb 11.4 oz (111 kg)   05/12/22 244 lb (110.7 kg)   04/04/22 246 lb (111.6 kg)       PHYSICAL EXAM  Physical Exam    Patient has a palpable dorsalis pedis and posterior tibial pulse bilaterally. She has a normal hair growth pattern on her bilateral lower extremities. Capillary fill time is brisk to all digits. Patient has extensive varicose veins noted on her bilateral foot and legs. There is moderate nonpitting edema noted on the bilateral lower extremities. There is a large plaque noted that is erythematous over the anterior lateral aspect of the right foot and ankle. There are dried crusty likely ruptured vesicles noted at the periphery of the plaque. There is no fluctuance or crepitus associated with this.   The area is not warm to the touch. The area is much improved from previous examination  Patient's muscle strength is intact and symmetrical for dorsiflexors plantar flexors inverters and everters bilaterally. Patient has a mild to moderate bunion deformity noted bilaterally    Procedure Note  Indications:  Based on my examination of this patient's wound(s)/ulcer(s) today, debridement is required to promote healing and evaluate the wound base. Performed by: Sarah Madrigal DPM    Consent obtained:  Yes    Time out taken:  Yes    Pain Control: Anesthetic  Anesthetic: 4% Lidocaine Liquid Topical       Debridement: Excisional Debridement    Using #15 blade scalpel the wound(s)/ulcer(s) was/were debrided down through and including the removal of epidermis, dermis and subcutaneous tissue. Devitalized Tissue Debrided:  fibrin and slough    Pre Debridement Measurements:  Are located in the [place]  Documentation Flow Sheet    Diabetic/Pressure/Non Pressure Ulcers only:  Ulcer: Non-Pressure ulcer, fat layer exposed     Wound/Ulcer #: 1    Post Debridement Measurements:  Wound/Ulcer Descriptions are Pre Debridement except measurements:    Wound 06/23/22 Leg Right;Lateral;Lower #1 Noted 2/1/22 (Active)   Wound Image   06/23/22 0903   Wound Etiology Other 06/23/22 0935   Dressing Status Old drainage noted 06/23/22 0903   Dressing/Treatment Other (comment) 07/07/22 0954   Wound Length (cm) 3 cm 07/14/22 0904   Wound Width (cm) 1.8 cm 07/14/22 0904   Wound Depth (cm) 0.4 cm 07/14/22 0904   Wound Surface Area (cm^2) 5.4 cm^2 07/14/22 0904   Change in Wound Size % (l*w) 92.21 07/14/22 0904   Wound Volume (cm^3) 2.16 cm^3 07/14/22 0904   Wound Healing % 69 07/14/22 0904   Post-Procedure Length (cm) 3.1 cm 07/14/22 0924   Post-Procedure Width (cm) 1.9 cm 07/14/22 0924   Post-Procedure Depth (cm) 0.4 cm 07/14/22 0924   Post-Procedure Surface Area (cm^2) 5.89 cm^2 07/14/22 0924   Post-Procedure Volume (cm^3) 2. 356 cm^3 07/14/22 4144        Topical Treatments:  Do not apply lotions, creams, or ointments to wound bed unless directed. []?? Apply moisturizing lotion to skin surrounding the wound prior to dressing change. []?? Apply antifungal ointment to skin surrounding the wound prior to dressing change. []?? Apply thin film of moisture barrier ointment to skin immediately around wound. []?? Other:                  Dressings:                  Wound Location : RIGHT LOWER LEG   [x]? ? Apply Primary Dressing:                                            [x]? ? Other:  APPLY BETAMETHASONE TODAY AT [address] [place] [address],[address]    [x]? ? Cover and Secure with:                   [x]? ? Gauze         []? ? Ghazal           []? ? Roll gauze              []? ? Ace Wrap   []? ? Cover Roll Tape     []? ? ABD                                      []? ? Other:               Avoid contact of tape with skin. []?? Change dressing:            [x]? ? Do Not Change Dressing     []? ? Other:                      Edema Control:  Apply:  []?? Compression Stocking       []? ? Right Leg     []? ? Left Leg              []? ? Tubigrip      []? ? Right Leg Double Layer      []? ? Left Leg Double Layer                                                  []? ? Right Leg Single Layer       []? ? Left Leg Single Layer              []? ? SpandaGrip            []? ? Right Leg     []? ? Left Leg                                      []? ?Low compression 5-10 mm/Hg                                             []? ? Medium compression 10-20 mm/Hg                                      []? ? High compression  20-30 mm/Hg              every morning immediately when getting up should be applied to affected leg(s) from mid foot to knee making sure to cover the heel.  Remove every night before going to bed.              [x]? ? Elevate leg(s) above the level of the heart when sitting.                 [x]? ? Avoid prolonged standing in one place.                   Compression: COBAN 2   Apply:  [x]? ? Multilayer Compression Wrap Applied in Clinic    [x]? ? RightLeg      []? ? Left Leg              [x]? ? Multi-layer compression.  Do not get leg(s) with wrap wet.  If wraps become too tight call the center or completely remove the wrap.                      [x]? ? Elevate leg(s) above the level of the heart when sitting.                 [x]? ? Avoid prolonged standing in one place.     Off-Loading:   []?? Off-loading when    []? ? walking       []? ? in bed         []? ? sitting  []? ? Total non-weight bearing  []? ? Right Leg  []? ? Left Leg          []? ?Taylor Rear Device     []? ? Walker        []? ? Cane           []? ? Wheelchair  []? ? Crutches              []? ? Surgical shoe    []? ? Podus Boot(s)   []? ? Foam Boot(s)  []? ? Roll About              []? ? Cast Boot   []? ?[employer]  []? ? Other:                 Dietary:  [x]? ? Diet as tolerated:     []? ? Calorie Diabetic Diet:           []? ? No Added Salt:  []?? Increase Protein:     []? ? Other:                Activity:  [x]? ? Activity as tolerated:  []?? Patient has no activity restrictions     []? ? Strict Bedrest: []? ? Remain off Work:     []? ? May return to full duty work:                                    []? ? Return to work with [address]     If you are still having pain after you go home:  []?? Elevate the affected limb.    []?? Use over-the-counter medications you would normally use for pain as permitted by your family doctor. []?? For persistent pain not relieved by the above interventions, please call your family doctor.             Return Appointment:  []?? Wound and dressing supply provider:   []?? ECF or Home Healthcare:  []?? Wound Assessment:          []? ? Physician or NP scheduled for Wound Assessment:   [x]? ? Return Appointment: With DR HOLLOWAY  in 1 Week(s)  []? ? Ordered tests:      **START DOXYCYCYCLINE 100MG - TAKE ONE TABLET TWICE PER DAY FOR 10 DAYS ( SCRIPT SENT TO PHARMACY )**     Nurse Case Manger: [facility]  Electronically signed by Will Hernandez RN on 7/14/2022 at 9:26 AM       [address] [place] [address] Information: Should you experience any significant changes in your wound(s) or have questions about your wound care, please contact the [address] [place] [address] [phone] [address] 8:00 am - 4:30 pm and Friday 8:00 am - 12:30 pm.  If you need help with your wound outside these hours and cannot wait until we are again available, contact your PCP or go to the hospital emergency room.      PLEASE NOTE: IF YOU ARE UNABLE TO OBTAIN WOUND SUPPLIES, CONTINUE TO USE THE SUPPLIES YOU HAVE AVAILABLE UNTIL YOU ARE ABLE TO [facility].  IT IS MOST IMPORTANT TO KEEP THE WOUND COVERED AT ALL TIMES.     Physician Signature:_______________________     Date: ___________ Time:  ____________                                Dr Roseline Longo        Electronically signed by Roseline Longo DPM on 7/14/2022 at 10:04 AM

## 2022-07-14 NOTE — PLAN OF CARE
Multilayer Compression Wrap   (Not Unna) Below the Knee    NAME:  Meliza Glass OF BIRTH:  1970  MEDICAL RECORD NUMBER:  [id number]  DATE:  7/14/2022    Multilayer compression wrap: Removed old Multilayer wrap if indicated and wash leg with mild soap/water. Applied moisturizing agent to dry skin as needed. Applied primary and secondary dressing as ordered. Applied multilayered dressing below the knee to right lower leg. Instructed patient/caregiver not to remove dressing and to keep it clean and dry. Instructed patient/caregiver on complications to report to provider, such as pain, numbness in toes, heavy drainage, and slippage of dressing. Instructed patient on purpose of compression dressing and on activity and exercise recommendations.       Electronically signed by Holland Russell RN on 7/14/2022 at 10:20 AM

## 2022-07-18 NOTE — DISCHARGE INSTRUCTIONS
[facility] and Hyperbaric Oxygen Therapy   Physician Orders and Discharge Instructions  [address]  [address] [place] [address]  Telephone: [phone]     NAME:  Stefanie Yanes OF BIRTH:  1970  MEDICAL RECORD NUMBER:  [id number]  DATE:  7/14/2022     Wound Cleansing:  Do not scrub or use excessive force. Cleanse wound prior to applying a clean dressing with:  [] Normal Saline  [] Keep Wound Dry in Shower    [] Wound Cleanser  [] Cleanse wound with Mild Soap & Water  [] May Shower at Discharge  [] Other:        Topical Treatments:  Do not apply lotions, creams, or ointments to wound bed unless directed. [] Apply moisturizing lotion to skin surrounding the wound prior to dressing change.  [] Apply antifungal ointment to skin surrounding the wound prior to dressing change.  [] Apply thin film of moisture barrier ointment to skin immediately around wound. [] Other:                  Dressings:                  Wound Location : RIGHT LOWER LEG  [x] Apply Primary Dressing:                                            [x] Other:  APPLY BETAMETHASONE TODAY AT [address] [place] [address],[address]     [x] Cover and Secure with:                   [x] Gauze         [] Ghazal           [] Roll gauze              [] Ace Wrap   [] Cover Roll Tape     [] ABD                                      [] Other:              Avoid contact of tape with skin.      [] Change dressing:            [x] Do Not Change Dressing     [] Other:                      Edema Control:  Apply:  [] Compression Stocking       []Right Leg     []Left Leg              [] Tubigrip      []Right Leg Double Layer      []Left Leg Double Layer                                                  []Right Leg Single Layer       []Left Leg Single Layer              [] SpandaGrip            []Right Leg     []Left Leg                                      []Low compression 5-10 mm/Hg []Medium compression 10-20 mm/Hg                                      []High compression  20-30 mm/Hg              every morning immediately when getting up should be applied to affected leg(s) from mid foot to knee making sure to cover the heel. Remove every night before going to bed. [x] Elevate leg(s) above the level of the heart when sitting. [x] Avoid prolonged standing in one place. Compression: COBAN 2  Apply:  [x] Multilayer Compression Wrap Applied in Clinic    [x]RightLeg      []Left Leg              [x] Multi-layer compression. Do not get leg(s) with wrap wet. If wraps become too tight call the center or completely remove the wrap. [x] Elevate leg(s) above the level of the heart when sitting. [x] Avoid prolonged standing in one place. Off-Loading:   [] Off-loading when    [] walking       [] in bed         [] sitting  [] Total non-weight bearing  [] Right Leg  [] Left Leg          [] Assistive Device     [] Walker        [] Cane           [] Wheelchair  [] Crutches              [] Surgical shoe    [] Podus Boot(s)   [] Foam Boot(s)  [] Roll About              [] Cast Boot   [] CROW Boot  [] Other:                 Dietary:  [x] Diet as tolerated:     [] Calorie Diabetic Diet:           [] No Added Salt:  [] Increase Protein:     [] Other:                Activity:  [x] Activity as tolerated:  [] Patient has no activity restrictions     [] Strict Bedrest: [] Remain off Work:     [] May return to full duty work:                                    [] Return to work with restrictions: If you are still having pain after you go home:  [] Elevate the affected limb. [] Use over-the-counter medications you would normally use for pain as permitted by your family doctor. [] For persistent pain not relieved by the above interventions, please call your family doctor.

## 2022-07-21 ENCOUNTER — HOSPITAL ENCOUNTER (OUTPATIENT)
Dept: WOUND CARE | Age: 52
Discharge: HOME | End: 2022-07-21

## 2022-07-22 NOTE — DISCHARGE INSTRUCTIONS
[]Medium compression 10-20 mm/Hg                                      []High compression  20-30 mm/Hg              every morning immediately when getting up should be applied to affected leg(s) from mid foot to knee making sure to cover the heel. Remove every night before going to bed. [x] Elevate leg(s) above the level of the heart when sitting. [x] Avoid prolonged standing in one place. Compression: COBAN 2  Apply:  [x] Multilayer Compression Wrap Applied in Clinic    [x]RightLeg      []Left Leg              [x] Multi-layer compression. Do not get leg(s) with wrap wet. If wraps become too tight call the center or completely remove the wrap. [x] Elevate leg(s) above the level of the heart when sitting. [x] Avoid prolonged standing in one place. Off-Loading:   [] Off-loading when    [] walking       [] in bed         [] sitting  [] Total non-weight bearing  [] Right Leg  [] Left Leg          [] Assistive Device     [] Walker        [] Cane           [] Wheelchair  [] Crutches              [] Surgical shoe    [] Podus Boot(s)   [] Foam Boot(s)  [] Roll About              [] Cast Boot   [] CROW Boot  [] Other:                 Dietary:  [x] Diet as tolerated:     [] Calorie Diabetic Diet:           [] No Added Salt:  [] Increase Protein:     [] Other:                Activity:  [x] Activity as tolerated:  [] Patient has no activity restrictions     [] Strict Bedrest: [] Remain off Work:     [] May return to full duty work:                                    [] Return to work with restrictions: If you are still having pain after you go home:  [] Elevate the affected limb. [] Use over-the-counter medications you would normally use for pain as permitted by your family doctor. [] For persistent pain not relieved by the above interventions, please call your family doctor. Return Appointment:  [] Wound and dressing supply provider:  [] ECF or Home Healthcare:  [] Wound Assessment:          [] Physician or NP scheduled for Wound Assessment:  [x] Return Appointment: With DR HOLLOWAY  in 1 Week(s)  [] Ordered tests:          Nurse Case Manger:  SUSY    Electronically signed by Azucena Hernandez RN on 7/28/2022 at 9:41 AM             Peg Thompson 281: Should you experience any significant changes in your wound(s) or have questions about your wound care, please contact the [address] [place] [address] at [address] 8:00 am - 4:30 pm and Friday 8:00 am - 12:30 pm.  If you need help with your wound outside these hours and cannot wait until we are again available, contact your PCP or go to the hospital emergency room. PLEASE NOTE: IF YOU ARE UNABLE TO OBTAIN WOUND SUPPLIES, CONTINUE TO USE THE SUPPLIES YOU HAVE AVAILABLE UNTIL YOU ARE ABLE TO REACH US. IT IS MOST IMPORTANT TO KEEP THE WOUND COVERED AT ALL TIMES.      Physician Signature:_______________________     Date: ___________ Time:  ____________                                Dr Clement Simpson

## 2022-07-27 ENCOUNTER — OFFICE VISIT (OUTPATIENT)
Dept: ENT CLINIC | Age: 52
End: 2022-07-27
Payer: MEDICAID

## 2022-07-27 VITALS
HEART RATE: 86 BPM | BODY MASS INDEX: 46.82 KG/M2 | DIASTOLIC BLOOD PRESSURE: 78 MMHG | WEIGHT: 248 LBS | SYSTOLIC BLOOD PRESSURE: 111 MMHG | HEIGHT: 61 IN

## 2022-07-27 DIAGNOSIS — K11.8 PAROTID MASS: Primary | ICD-10-CM

## 2022-07-27 PROCEDURE — 99214 OFFICE O/P EST MOD 30 MIN: CPT | Performed by: OTOLARYNGOLOGY

## 2022-07-27 NOTE — PROGRESS NOTES
[address] [facility]  Follow up      Patient Name: [id number] Record Number:  [id number]  [facility]  [facility]  Date of Consultation: 7/27/2022    Chief Complaint: Parotid mass        Interval History    Patient is following up for her parotid lesion. I last saw her in April 2022. She had multiple small lesions of the tail of parotid on the right side. We ended up getting an ultrasound-guided FNA which showed inflammation. The patient says that she does not instantly think is gotten worse, but it is a little bit uncomfortable. No other changes. REVIEW OF SYSTEMS  As above    PHYSICAL EXAM  GENERAL: No Acute Distress, Alert and Oriented, no Hoarseness, strong voice  EYES: EOMI, Anti-icteric  HENT:   Head: Normocephalic and atraumatic. Face: Difficulty to feel anything in the right parotid area, but it is a bit tender to palpation. No skin changes. Right Ear: Normal external ear, normal external auditory canal, intact tympanic membrane with normal mobility and aerated middle ear  Left Ear: Normal external ear, normal external auditory canal, intact tympanic membrane with normal mobility and aerated middle ear  Mouth/Oral Cavity:  normal lips, Uvula is midline, no mucosal lesions, no trismus,  Oropharynx/Larynx:  normal oropharynx,   Nose:Normal external nasal appearance. A  NECK: Normal range of motion, no thyromegaly, trachea is midline, no lymphadenopathy, no neck masses, no crepitus          ASSESSMENT/PLAN  Parotid mass-I cannot feel a change, however she is still having discomfort. I think we should get a CT scan just to make sure there is no increase in size of the nodules. If there is no change or they have gotten smaller I do not think anything else needs to be done. If they have enlarged we may have to consider removal.  I will call her with the results. - CT SOFT TISSUE NECK W CONTRAST;  Future I have performed a head and neck physical exam personally or was physically present during the key or critical portions of the service. This note was generated completely or in part utilizing Dragon dictation speech recognition software. Occasionally, words are mistranscribed and despite editing, the text may contain inaccuracies due to incorrect word recognition. If further clarification is needed please contact the office at [phone].

## 2022-07-28 ENCOUNTER — HOSPITAL ENCOUNTER (OUTPATIENT)
Dept: WOUND CARE | Age: 52
Discharge: HOME | End: 2022-07-28
Payer: MEDICAID

## 2022-07-28 VITALS
RESPIRATION RATE: 18 BRPM | HEART RATE: 89 BPM | SYSTOLIC BLOOD PRESSURE: 124 MMHG | DIASTOLIC BLOOD PRESSURE: 64 MMHG | TEMPERATURE: 97.7 F

## 2022-07-28 DIAGNOSIS — L30.9 ACUTE DERMATITIS: Primary | ICD-10-CM

## 2022-07-28 PROCEDURE — 29581 APPL MULTLAYER CMPRN SYS LEG: CPT

## 2022-07-28 RX ORDER — LIDOCAINE 40 MG/G
CREAM TOPICAL ONCE
Status: CANCELLED | OUTPATIENT
Start: 2022-07-28 | End: 2022-07-28

## 2022-07-28 RX ORDER — BACITRACIN ZINC AND POLYMYXIN B SULFATE 500; 1000 [USP'U]/G; [USP'U]/G
OINTMENT TOPICAL ONCE
Status: CANCELLED | OUTPATIENT
Start: 2022-07-28 | End: 2022-07-28

## 2022-07-28 RX ORDER — GENTAMICIN SULFATE 1 MG/G
OINTMENT TOPICAL ONCE
Status: CANCELLED | OUTPATIENT
Start: 2022-07-28 | End: 2022-07-28

## 2022-07-28 RX ORDER — CLOBETASOL PROPIONATE 0.5 MG/G
OINTMENT TOPICAL ONCE
Status: CANCELLED | OUTPATIENT
Start: 2022-07-28 | End: 2022-07-28

## 2022-07-28 RX ORDER — LIDOCAINE HYDROCHLORIDE 40 MG/ML
SOLUTION TOPICAL ONCE
Status: CANCELLED | OUTPATIENT
Start: 2022-07-28 | End: 2022-07-28

## 2022-07-28 RX ORDER — LIDOCAINE HYDROCHLORIDE 20 MG/ML
JELLY TOPICAL ONCE
Status: CANCELLED | OUTPATIENT
Start: 2022-07-28 | End: 2022-07-28

## 2022-07-28 RX ORDER — BETAMETHASONE DIPROPIONATE 0.05 %
OINTMENT (GRAM) TOPICAL ONCE
Status: CANCELLED | OUTPATIENT
Start: 2022-07-28 | End: 2022-07-28

## 2022-07-28 RX ORDER — LIDOCAINE 50 MG/G
OINTMENT TOPICAL ONCE
Status: CANCELLED | OUTPATIENT
Start: 2022-07-28 | End: 2022-07-28

## 2022-07-28 RX ORDER — BACITRACIN, NEOMYCIN, POLYMYXIN B 400; 3.5; 5 [USP'U]/G; MG/G; [USP'U]/G
OINTMENT TOPICAL ONCE
Status: CANCELLED | OUTPATIENT
Start: 2022-07-28 | End: 2022-07-28

## 2022-07-28 RX ORDER — GINSENG 100 MG
CAPSULE ORAL ONCE
Status: CANCELLED | OUTPATIENT
Start: 2022-07-28 | End: 2022-07-28

## 2022-07-28 ASSESSMENT — PAIN SCALES - GENERAL: PAINLEVEL_OUTOF10: 0

## 2022-08-01 NOTE — PROGRESS NOTES
[address]   Progress Note and Procedure Note      [facility] RECORD NUMBER:  [id number]  AGE: 54 y.o. GENDER: female  : 1970  EPISODE DATE:  2022    Subjective:     Chief Complaint   Patient presents with    Wound Check     Follow up Wound Right Lower Leg           HISTORY of PRESENT ILLNESS HPI     Aldean Ahumada is a 54 y.o. female who presents today for wound/ulcer evaluation. History of Wound Context: Presents today for a \"sore\" on her right anterior dorsal foot and ankle. She relates it has been there for over 3 months. She has had treatment by her primary care physician as well as Dr. José Miguel Quinonez. She has used both a topical steroid and emollients without any improvement in her symptoms. She states the rash itches. She denies any constitutional symptoms or warmth associated with the lesion. She denies any trauma to the area. She denies any history of a burn or a splash of chemicals to the area. Patient tolerated her multilayer compression dressing and related while it was on the area felt better and had decreased itching associated with it. Patient relates she has ordered compression stockings and is just waiting for the wound to be healed so she can pick them up and begin wearing them. Wound/Ulcer Pain Timing/Severity: mild, patient relates it is more itchy than painful. She has tolerated her compression dressing well. Quality of pain: N/A  Severity:  0 / 10   Modifying Factors: None  Associated Signs/Symptoms: edema    Ulcer Identification:  Ulcer Type:  Dermatitis    Contributing Factors: edema and venous stasis    Acute Wound: N/A    PAST MEDICAL HISTORY        Diagnosis Date    Arthritis     Back pain     Headache        PAST SURGICAL HISTORY    Past Surgical History:   Procedure Laterality Date     SECTION         FAMILY HISTORY    History reviewed. No pertinent family history.     SOCIAL HISTORY    Social History     Tobacco Use    Smoking status: Never    Smokeless tobacco: Never   Substance Use Topics    Alcohol use: No    Drug use: No       ALLERGIES    Allergies   Allergen Reactions    Amoxicillin     Penicillins        MEDICATIONS    Current Outpatient Medications on File Prior to Encounter   Medication Sig Dispense Refill    cyclobenzaprine (FLEXERIL) 10 MG tablet Take 10 mg by mouth 3 times daily as needed for Muscle spasms      ketoconazole (NIZORAL) 2 % cream Apply topically bid to right foot lesion. 60 g 5    loratadine (CLARITIN) 10 MG tablet TAKE 1 TABLET EVERY DAY BY ORAL ROUTE AS NEEDED. naproxen (NAPROSYN) 500 MG tablet Take 1 tablet by mouth 2 times daily (with meals) 60 tablet 5    ibuprofen (ADVIL;MOTRIN) 600 MG tablet Take 1 tablet by mouth every 8 hours as needed for Pain (Headache) 20 tablet 0     No current facility-administered medications on file prior to encounter. REVIEW OF SYSTEMS  Review of Systems    Pertinent items are noted in HPI. Review of Systems: A 12 point review of symptoms is unremarkable with the exception of the chief complaint. Patient specifically denies nausea, fever, vomiting, chills, shortness of breath, chest pain, abdominal pain, constipation or difficulty urinating. Objective:      /64   Pulse 89   Temp 97.7 °F (36.5 °C) (Temporal)   Resp 18     Wt Readings from Last 3 Encounters:   07/27/22 248 lb (112.5 kg)   06/23/22 244 lb 11.4 oz (111 kg)   05/12/22 244 lb (110.7 kg)       PHYSICAL EXAM  Physical Exam    Patient has a palpable dorsalis pedis and posterior tibial pulse bilaterally. She has a normal hair growth pattern on her bilateral lower extremities. Capillary fill time is brisk to all digits. Patient has extensive varicose veins noted on her bilateral foot and legs. There is moderate nonpitting edema noted on the bilateral lower extremities.   There is a large plaque noted that is erythematous over the anterior lateral aspect of the right foot and ankle. There are dried crusty likely ruptured vesicles noted at the periphery of the plaque. There is no fluctuance or crepitus associated with this. The area is not warm to the touch. The area is much improved from previous examination  The punch biopsy sites have epithelialized. Patient's muscle strength is intact and symmetrical for dorsiflexors plantar flexors inverters and everters bilaterally. Patient has a mild to moderate bunion deformity noted bilaterally      Assessment:        Problem List Items Addressed This Visit       Acute dermatitis - Primary           Plan:   E/M x30 minutes     Pathology report was reviewed. A multilayer compression dressing was applied to the patient's right lower extremity. She was instructed to keep it clean dry and intact. Patient has significant venous disease. We will control her edema with multilayer compression dressing. Treatment Note please see attached Discharge Instructions    Written patient dismissal instructions given to patient and signed by patient or POA. Reappoint 1 weeks for follow-up    Discharge Instructions         [address] and Hyperbaric Oxygen Therapy   Physician Orders and Discharge Instructions  [facility]  [address] [facility] [address], [place] [address]  Telephone: [phone]     NAME:  Stefanie Yanes OF BIRTH:  1970  MEDICAL RECORD NUMBER:  [id number]  DATE:  7/28/2022     Wound Cleansing:  Do not scrub or use excessive force. Cleanse wound prior to applying a clean dressing with:  [] Normal Saline  [] Keep Wound Dry in Shower    [] Wound Cleanser  [] Cleanse wound with Mild Soap & Water  [] May Shower at Discharge  [] Other:        Topical Treatments:  Do not apply lotions, creams, or ointments to wound bed unless directed. [] Apply moisturizing lotion to skin surrounding the wound prior to dressing change.   [] Apply antifungal ointment to skin surrounding the wound prior to dressing change.  [] Apply thin film of moisture barrier ointment to skin immediately around wound. [] Other:                  Dressings:                  Wound Location : RIGHT LOWER LEG  [x] Apply Primary Dressing:                                            [x] Other:  APPLY BETAMETHASONE TODAY AT [address] [place] [address],[address]     [x] Cover and Secure with:                   [x] Gauze         [] Ghazal           [] Roll gauze              [] Ace Wrap   [] Cover Roll Tape     [] ABD                                      [] Other:              Avoid contact of tape with skin. [] Change dressing:            [x] Do Not Change Dressing     [] Other:                      Edema Control:  Apply:  [] Compression Stocking       []Right Leg     []Left Leg              [] Tubigrip      []Right Leg Double Layer      []Left Leg Double Layer                                                  []Right Leg Single Layer       []Left Leg Single Layer              [] SpandaGrip            []Right Leg     []Left Leg                                      []Low compression 5-10 mm/Hg                                             []Medium compression 10-20 mm/Hg                                      []High compression  20-30 mm/Hg              every morning immediately when getting up should be applied to affected leg(s) from mid foot to knee making sure to cover the heel. Remove every night before going to bed. [x] Elevate leg(s) above the level of the heart when sitting. [x] Avoid prolonged standing in one place. Compression: COBAN 2  Apply:  [x] Multilayer Compression Wrap Applied in Clinic    [x]RightLeg      []Left Leg              [x] Multi-layer compression. Do not get leg(s) with wrap wet. If wraps become too tight call the center or completely remove the wrap.                       [x] Elevate leg(s) above the level of the heart when sitting. [x] Avoid prolonged standing in one place. Off-Loading:   [] Off-loading when    [] walking       [] in bed         [] sitting  [] Total non-weight bearing  [] Right Leg  [] Left Leg          [] Assistive Device     [] Walker        [] Cane           [] Wheelchair  [] Crutches              [] Surgical shoe    [] Podus Boot(s)   [] Foam Boot(s)  [] Roll About              [] Cast Boot   [] CROW Boot  [] Other:                 Dietary:  [x] Diet as tolerated:     [] Calorie Diabetic Diet:           [] No Added Salt:  [] Increase Protein:     [] Other:                Activity:  [x] Activity as tolerated:  [] Patient has no activity restrictions     [] Strict Bedrest: [] Remain off Work:     [] May return to full duty work:                                    [] Return to work with restrictions: If you are still having pain after you go home:  [] Elevate the affected limb. [] Use over-the-counter medications you would normally use for pain as permitted by your family doctor. [] For persistent pain not relieved by the above interventions, please call your family doctor. Return Appointment:  [] Wound and dressing supply provider:  [] ECF or Home Healthcare:  [] Wound Assessment:          [] Physician or NP scheduled for Wound Assessment:  [x] Return Appointment: With DR HOLLOWAY  in 1 Week(s)  [] Ordered tests:          Nurse Case Manger:  SUSY    Electronically signed by Capri Douglass RN on 7/28/2022 at 9:41 AM             Peg Thompson 281: Should you experience any significant changes in your wound(s) or have questions about your wound care, please contact the [address] [place] [address] at [address] 8:00 am - 4:30 pm and Friday 8:00 am - 12:30 pm.  If you need help with your wound outside these hours and cannot wait until we are again available, contact your PCP or go to the hospital emergency room.      PLEASE NOTE: IF YOU ARE UNABLE TO OBTAIN WOUND SUPPLIES, CONTINUE TO USE THE SUPPLIES YOU HAVE AVAILABLE UNTIL YOU ARE ABLE TO REACH US. IT IS MOST IMPORTANT TO KEEP THE WOUND COVERED AT ALL TIMES.      Physician Signature:_______________________     Date: ___________ Time:  ____________                                Dr Alysha Gifford        Electronically signed by Alysha Gifford DPM on 8/1/2022 at 11:02 AM

## 2022-08-04 ENCOUNTER — HOSPITAL ENCOUNTER (OUTPATIENT)
Dept: WOUND CARE | Age: 52
Discharge: HOME | End: 2022-08-04

## 2022-08-04 ENCOUNTER — TELEPHONE (OUTPATIENT)
Dept: WOUND CARE | Age: 52
End: 2022-08-04

## 2022-08-04 NOTE — TELEPHONE ENCOUNTER
Patient cancelled appointment today at [facility] due to not having money for gas. Spoke with patient and instructed patient that the compression wraps she has on her leg could not stay on until next weeks appointment. Patient states she should be able to get compression hose tomorrow at [facility]. Instructed patient that since her wound was healed last week, she could leave the wrap on until tomorrow and have Bernans remove it when she gets her hose. Instructed if she did to go to [facility] tomorrow, she should remove the hose 8/5/2022 an use ace wraps to control swelling. Also instructed patient that if her wound was still healed after compression wrap removed, she did not have to return to the wound care center unless she wants to keep appointment to be sure the wound is still healed. Patient voices understanding and states she will let us know if she wants to keep next appt.

## 2022-08-05 NOTE — DISCHARGE INSTRUCTIONS
[facility] Wound Care and Hyperbaric Oxygen Therapy   Physician Orders and Discharge Instructions  [facility]  [address] [facility]  [address], [facility]  Telephone: [phone]     NAME:  Henrry Morris OF BIRTH:  1970  MEDICAL RECORD NUMBER:  [id number]  DATE:  7/28/2022     Wound Cleansing:  Do not scrub or use excessive force. Cleanse wound prior to applying a clean dressing with:  [] Normal Saline  [] Keep Wound Dry in Shower    [] Wound Cleanser  [] Cleanse wound with Mild Soap & Water  [] May Shower at Discharge  [] Other:        Topical Treatments:  Do not apply lotions, creams, or ointments to wound bed unless directed. [] Apply moisturizing lotion to skin surrounding the wound prior to dressing change.  [] Apply antifungal ointment to skin surrounding the wound prior to dressing change.  [] Apply thin film of moisture barrier ointment to skin immediately around wound. [] Other:                  Dressings:                  Wound Location : RIGHT LOWER LEG  [x] Apply Primary Dressing:                                            [x] Other:  APPLY BETAMETHASONE TODAY AT [address] [place] [address],[address]     [x] Cover and Secure with:                   [x] Gauze         [] Ghazal           [] Roll gauze              [] Ace Wrap   [] Cover Roll Tape     [] ABD                                      [] Other:              Avoid contact of tape with skin.      [] Change dressing:            [x] Do Not Change Dressing     [] Other:                      Edema Control:  Apply:  [] Compression Stocking       []Right Leg     []Left Leg              [] Tubigrip      []Right Leg Double Layer      []Left Leg Double Layer                                                  []Right Leg Single Layer       []Left Leg Single Layer              [] SpandaGrip            []Right Leg     []Left Leg                                      []Low compression 5-10 mm/Hg []Medium compression 10-20 mm/Hg                                      []High compression  20-30 mm/Hg              every morning immediately when getting up should be applied to affected leg(s) from mid foot to knee making sure to cover the heel. Remove every night before going to bed. [x] Elevate leg(s) above the level of the heart when sitting. [x] Avoid prolonged standing in one place. Compression: COBAN 2  Apply:  [x] Multilayer Compression Wrap Applied in Clinic    [x]RightLeg      []Left Leg              [x] Multi-layer compression. Do not get leg(s) with wrap wet. If wraps become too tight call the center or completely remove the wrap. [x] Elevate leg(s) above the level of the heart when sitting. [x] Avoid prolonged standing in one place. Off-Loading:   [] Off-loading when    [] walking       [] in bed         [] sitting  [] Total non-weight bearing  [] Right Leg  [] Left Leg          [] Assistive Device     [] Walker        [] Cane           [] Wheelchair  [] Crutches              [] Surgical shoe    [] Podus Boot(s)   [] Foam Boot(s)  [] Roll About              [] Cast Boot   [] CROW Boot  [] Other:                 Dietary:  [x] Diet as tolerated:     [] Calorie Diabetic Diet:           [] No Added Salt:  [] Increase Protein:     [] Other:                Activity:  [x] Activity as tolerated:  [] Patient has no activity restrictions     [] Strict Bedrest: [] Remain off Work:     [] May return to full duty work:                                    [] Return to work with restrictions: If you are still having pain after you go home:  [] Elevate the affected limb. [] Use over-the-counter medications you would normally use for pain as permitted by your family doctor. [] For persistent pain not relieved by the above interventions, please call your family doctor. Return Appointment:  [] Wound and dressing supply provider:  [] ECF or Home Healthcare:  [] Wound Assessment:          [] Physician or NP scheduled for Wound Assessment:  [x] Return Appointment: With DR HOLLOWAY  in 1 Week(s)  [] Ordered tests:           Nurse Case Manger:  SUSY     Electronically signed by Dominic Thompson RN on 7/28/2022 at 9:41 AM              [address]: Should you experience any significant changes in your wound(s) or have questions about your wound care, please contact the [address] [place] [address] at [address] 8:00 am - 4:30 pm and Friday 8:00 am - 12:30 pm.  If you need help with your wound outside these hours and cannot wait until we are again available, contact your PCP or go to the hospital emergency room. PLEASE NOTE: IF YOU ARE UNABLE TO OBTAIN WOUND SUPPLIES, CONTINUE TO USE THE SUPPLIES YOU HAVE AVAILABLE UNTIL YOU ARE ABLE TO REACH US. IT IS MOST IMPORTANT TO KEEP THE WOUND COVERED AT ALL TIMES.      Physician Signature:_______________________     Date: ___________ Time:  ____________                                Dr Scott Jj

## 2022-08-11 ENCOUNTER — HOSPITAL ENCOUNTER (OUTPATIENT)
Dept: WOUND CARE | Age: 52
Discharge: HOME | End: 2022-08-11

## 2022-09-19 ENCOUNTER — HOSPITAL ENCOUNTER (OUTPATIENT)
Dept: CT IMAGING | Age: 52
Discharge: HOME | End: 2022-09-19
Payer: MEDICAID

## 2022-09-19 DIAGNOSIS — K11.8 PAROTID MASS: ICD-10-CM

## 2022-09-19 PROCEDURE — 6360000004 HC RX CONTRAST MEDICATION: Performed by: OTOLARYNGOLOGY

## 2022-09-19 PROCEDURE — 70491 CT SOFT TISSUE NECK W/DYE: CPT

## 2022-09-19 RX ADMIN — IOPAMIDOL 100 ML: 755 INJECTION, SOLUTION INTRAVENOUS at 16:35

## 2022-09-26 ENCOUNTER — TELEPHONE (OUTPATIENT)
Dept: ENT CLINIC | Age: 52
End: 2022-09-26

## 2022-09-26 NOTE — TELEPHONE ENCOUNTER
I called the patient to let her know I feel as though the right parotid lesion is about the same as what it was when I compared it to the one in March. We already did an FNA that was essentially nondiagnostic. I told her we could either rebiopsy it with an FNA versus continued observation. She would rather just continue to observe it.   Tentative plan will be to follow-up in 6 months

## 2022-09-29 ENCOUNTER — HOSPITAL ENCOUNTER (OUTPATIENT)
Dept: WOUND CARE | Age: 52
Discharge: HOME | End: 2022-09-29
Payer: MEDICAID

## 2022-09-29 VITALS
BODY MASS INDEX: 46.82 KG/M2 | WEIGHT: 247.8 LBS | TEMPERATURE: 97.2 F | DIASTOLIC BLOOD PRESSURE: 75 MMHG | HEART RATE: 89 BPM | SYSTOLIC BLOOD PRESSURE: 126 MMHG | RESPIRATION RATE: 18 BRPM

## 2022-09-29 DIAGNOSIS — L30.9 ACUTE DERMATITIS: ICD-10-CM

## 2022-09-29 DIAGNOSIS — I83.213 VARICOSE VEINS OF RIGHT LOWER EXTREMITY WITH INFLAMMATION, WITH ULCER OF ANKLE LIMITED TO BREAKDOWN OF SKIN (HCC): Primary | ICD-10-CM

## 2022-09-29 DIAGNOSIS — L97.311 VARICOSE VEINS OF RIGHT LOWER EXTREMITY WITH INFLAMMATION, WITH ULCER OF ANKLE LIMITED TO BREAKDOWN OF SKIN (HCC): Primary | ICD-10-CM

## 2022-09-29 PROCEDURE — 29581 APPL MULTLAYER CMPRN SYS LEG: CPT

## 2022-09-29 RX ORDER — LIDOCAINE HYDROCHLORIDE 20 MG/ML
JELLY TOPICAL ONCE
Status: CANCELLED | OUTPATIENT
Start: 2022-09-29 | End: 2022-09-29

## 2022-09-29 RX ORDER — LIDOCAINE 40 MG/G
CREAM TOPICAL ONCE
Status: CANCELLED | OUTPATIENT
Start: 2022-09-29 | End: 2022-09-29

## 2022-09-29 RX ORDER — LIDOCAINE HYDROCHLORIDE 40 MG/ML
SOLUTION TOPICAL ONCE
Status: CANCELLED | OUTPATIENT
Start: 2022-09-29 | End: 2022-09-29

## 2022-09-29 RX ORDER — BETAMETHASONE DIPROPIONATE 0.05 %
OINTMENT (GRAM) TOPICAL ONCE
Status: CANCELLED | OUTPATIENT
Start: 2022-09-29 | End: 2022-09-29

## 2022-09-29 RX ORDER — LIDOCAINE HYDROCHLORIDE 40 MG/ML
SOLUTION TOPICAL ONCE
Status: COMPLETED | OUTPATIENT
Start: 2022-09-29 | End: 2022-09-29

## 2022-09-29 RX ORDER — GENTAMICIN SULFATE 1 MG/G
OINTMENT TOPICAL ONCE
Status: CANCELLED | OUTPATIENT
Start: 2022-09-29 | End: 2022-09-29

## 2022-09-29 RX ORDER — AMITRIPTYLINE HYDROCHLORIDE 25 MG/1
25 TABLET, FILM COATED ORAL NIGHTLY
COMMUNITY
Start: 2022-07-12

## 2022-09-29 RX ORDER — BACITRACIN ZINC AND POLYMYXIN B SULFATE 500; 1000 [USP'U]/G; [USP'U]/G
OINTMENT TOPICAL ONCE
Status: CANCELLED | OUTPATIENT
Start: 2022-09-29 | End: 2022-09-29

## 2022-09-29 RX ORDER — SUMATRIPTAN 100 MG/1
100 TABLET, FILM COATED ORAL AS NEEDED
COMMUNITY
Start: 2022-09-22

## 2022-09-29 RX ORDER — CLOBETASOL PROPIONATE 0.5 MG/G
OINTMENT TOPICAL ONCE
Status: CANCELLED | OUTPATIENT
Start: 2022-09-29 | End: 2022-09-29

## 2022-09-29 RX ORDER — BACITRACIN, NEOMYCIN, POLYMYXIN B 400; 3.5; 5 [USP'U]/G; MG/G; [USP'U]/G
OINTMENT TOPICAL ONCE
Status: CANCELLED | OUTPATIENT
Start: 2022-09-29 | End: 2022-09-29

## 2022-09-29 RX ORDER — GINSENG 100 MG
CAPSULE ORAL ONCE
Status: CANCELLED | OUTPATIENT
Start: 2022-09-29 | End: 2022-09-29

## 2022-09-29 RX ORDER — LIDOCAINE 50 MG/G
OINTMENT TOPICAL ONCE
Status: CANCELLED | OUTPATIENT
Start: 2022-09-29 | End: 2022-09-29

## 2022-09-29 RX ADMIN — LIDOCAINE HYDROCHLORIDE: 40 SOLUTION TOPICAL at 09:30

## 2022-09-29 ASSESSMENT — PAIN DESCRIPTION - PAIN TYPE: TYPE: ACUTE PAIN

## 2022-09-29 ASSESSMENT — PAIN DESCRIPTION - DESCRIPTORS: DESCRIPTORS: THROBBING

## 2022-09-29 ASSESSMENT — PAIN SCALES - GENERAL
PAINLEVEL_OUTOF10: 8
PAINLEVEL_OUTOF10: 0

## 2022-09-29 ASSESSMENT — PAIN DESCRIPTION - LOCATION: LOCATION: ANKLE

## 2022-09-29 ASSESSMENT — PAIN DESCRIPTION - FREQUENCY: FREQUENCY: INTERMITTENT

## 2022-09-29 ASSESSMENT — PAIN DESCRIPTION - ORIENTATION: ORIENTATION: RIGHT

## 2022-09-29 ASSESSMENT — PAIN - FUNCTIONAL ASSESSMENT: PAIN_FUNCTIONAL_ASSESSMENT: PREVENTS OR INTERFERES SOME ACTIVE ACTIVITIES AND ADLS

## 2022-09-29 NOTE — PLAN OF CARE
Multilayer Compression Wrap   (Not Unna) Below the Knee    NAME:  Rocio Se OF BIRTH:  1970  MEDICAL RECORD NUMBER:  [id number]  DATE:  9/29/2022    Multilayer compression wrap: Applied moisturizing agent to dry skin as needed. Applied primary and secondary dressing as ordered. Applied multilayered dressing below the knee to right lower leg. Instructed patient/caregiver not to remove dressing and to keep it clean and dry. Instructed patient/caregiver on complications to report to provider, such as pain, numbness in toes, heavy drainage, and slippage of dressing. Instructed patient on purpose of compression dressing and on activity and exercise recommendations.       Electronically signed by Amelia Morse RN on 9/29/2022 at 10:17 AM

## 2022-09-29 NOTE — DISCHARGE INSTRUCTIONS
[address] Physician Orders and Discharge Instructions  [address] [place] [address], [address] [place] [address]  Telephone: [phone]  [address] 8:00 am - 4:30 pm and Friday 8:00 am - 12:00 pm.        NAME:  Марина Miranda  YOB: 1970  MEDICAL RECORD NUMBER:  [id number]  DATE:  9/29/2022    Important Reminders:   Please wash hands with soap and water before and after every dressing change. Do not scrub wounds. Keep wounds dry in shower unless otherwise instructed by the physician. SMOKING can slow would healing. Stop smoking as soon as possible to improve healing and prevent further complications associated with smoking. Sarah-Wound Topical Treatments:  Do not apply lotions, creams, or ointments to wound bed unless directed. [] Apply moisturizing lotion to skin surrounding the wound prior to dressing change.  [] Apply antifungal ointment to skin surrounding the wound prior to dressing change.  [] Apply thin film of no sting moisture barrier ointment to skin immediately around      wound. [] Other:       Wound Location: RIGHT ANTERIOR ANKLE        Primary Dressing:  [x] BETAMETHASONE   []     Secondary Dressing:  [x] OPTILOCK  []       Dressing Frequency:  []   [x] Do Not Change Dressing            Compression and Edema Control: COBAN 2 TO RIGHT LEG  [] Wear Home Compression Stockings   [] Spandagrip to:    Size: []Low compression 5-10 mm/Hg      []Medium compression 10-20 mm/Hg           []High compression  20-30 mm/Hg  [] Ace Wrap Toes to Knee to    [x] Multilayer Compression Wrap: Coban 2 to Right Leg   Do not get leg(s) with wrap wet. If wraps become too tight call the center or completely remove the wrap. Pressure Relief and Off Loading:  [] Off-loading when [] walking  [] in bed [] sitting   Turn every 2 hours when in bed   Avoid putting direct pressure on the site of the wound.  Limit side lying to 30 degree tilt. Limit elevating the head of the bed greater than 30 degrees. [] Assistive Devices     Use as instructed by the provider      Activity: Activity as Tolerated      Dietary:   Continue your diet as tolerated. Protein is a key nutrient in helping to repair damaged tissue and promote new tissue growth. Good sources of protein include milk, yogurt, cheese, fish, lean meat and beans. If you are DIABETIC, having diabetes can make it hard for wounds to heal. Try to keep your blood sugar within it's target range. Limit Sodium, Alcohol and Sugar. Pain:   Please Note some pain, drainage and/or bleeding might be expected after seeing the provider. TO HELP ALLEVIATE PAIN WE RECOMMEND THE FOLLOWING  Elevate the affected limb. Use over the counter medications as permitted by your family doctor. For Persistent Pain not relieved by the above interventions, please notify your family doctor. Return Appointment:  [x] Return Appointment: With DR HOLLOWAY  in 1 Week(s)  [] Wound and dressing supply provider:   [] ECF or Home Healthcare:  [] Wound Assessment: [] Physician or NP scheduled for Wound Assessment:   [x] Orders placed during your visit: VENOUS REFLUX STUDY TO RIGHT LEG - PLEASE CALL [phone] TO SCHEDULE. : SUSY     Electronically signed by Deanna Johnson RN on 9/29/2022 at 9:49 AM       [address] [place] [address] Information: Should you experience any significant changes in your wound(s) or have questions about your wound care, please contact the [address] [place] [address] at [address] 8:00 am - 4:30 pm and Friday 8:00 am - 12:30 pm.  If you need help with your wound outside these hours and cannot wait until we are again available, contact your PCP or go to the hospital emergency room. PLEASE NOTE: IF YOU ARE UNABLE TO OBTAIN WOUND SUPPLIES, CONTINUE TO USE THE SUPPLIES YOU HAVE AVAILABLE UNTIL YOU ARE ABLE TO REACH US. IT IS MOST IMPORTANT TO KEEP THE WOUND COVERED AT ALL TIMES.      Physician Signature:_______________________    Date: ___________ Time:  ____________          Dr Priyanka Gonzalez

## 2022-09-29 NOTE — LETTER
[address]  [address] OFFICE [address] [facility]  [phone]  Dept: [phone]   TODAYS DATE: 9/26/2022    [facility]   Appointment Treatment Guidelines  Welcome MsAshley Henrietta Cedeno to the [address] [place] [address] Pkwy. We appreciate the confidence you have shown in choosing us as your wound care provider. Our goal is to heal your wound(s) as quickly as possible. Please read the items below regarding the nature of your appointments. 1. We will make every effort to schedule appointments that are convenient for you. Certain days and times may not be available, depending on your providers office hours and details of your care. 2. Patients will not necessarily be brought to an exam room in the order in which they arrive. Many providers work out of this office and patients are here for different procedures. Our goal is to serve you as quickly as possible. 3. We acknowledge that your time is valuable. Please remember that wound healing takes time and we appreciate your understanding that the length of each patients appointment will vary depending upon their need. 4. It is for your protection that we ask for insurance cards, photo ID, and new consent forms on your first visit and periodically throughout your treatment at all our facilities. 5. Wound Care treatment is known to be most effective when provided on a regular basis. Missed appointments, and not following the recommended plan of care can result in ineffective treatment and a poor outcome. If you find it difficult to keep appointments or to follow the recommended plan of care, it is your responsibility to let the staff know, so that we can work with you toward a solution. 6. If you need to miss an appointment, please call to let us know. We expect 24 hours notice for all cancellations.  We also expect missed visits to be rescheduled as soon as possible, preferably within the same week to promote the most effective healing time for your wound(s). 7. If you will be late for an appointment, please call our center to be sure that the provider can still see you when you arrive. If you are more than 15 minutes late your appointment may need to be rescheduled. 8. If two (2) appointments are missed without notifying us, your care plan may be discontinued. The same may happen if multiple visits are cancelled or rescheduled, even with notice. A missed visit is time when another patient, who also needs care, could have been seen. Thank you for your understanding and consideration.

## 2022-09-29 NOTE — LETTER
[address]  [address] [facility]  [phone]  Dept: [phone]        Thank you Ms. Alycia Schwartz for choosing [facility] for your Wound Care needs. We hope you found your first visit both encouraging and educational.  We look forward to serving you throughout the healing process. Before your next visit please review the information you received in your [employer]. The first visit can be overwhelming and this is a useful tool to refresh what information you may have been given. If you find yourself with any questions prior to your upcoming appointment, please feel free to contact us. Often wounds can be challenging and it is our goal to see you through the healing process with as much support possible. Again, thank you for choosing [address] [facility]!     Sincerely,      The Staff of [address] [place] [address] Pkwy and Hyperbaric Oxygen Therapy

## 2022-09-30 NOTE — PROGRESS NOTES
[address]   Progress Note and Procedure Note      [address] [facility] RECORD NUMBER:  [id number]  AGE: 50 y.o. GENDER: female  : 1970  EPISODE DATE:  2022    Subjective:     Chief Complaint   Patient presents with    Wound Check     Initial visit for right anterior ankle wound. Stated it has been an issue since 2022. Washing it with dial soap and keeping it dry. Placing a cream but cant remember the name. HISTORY of PRESENT ILLNESS HPI     Kaylynn Ramirez is a 50 y.o. female who presents today for wound/ulcer evaluation. History of Wound Context: Presents today for a \"sore\" on her right anterior dorsal foot and ankle. She relates it has been there for over 3 months. She has had treatment by her primary care physician as well as Dr. Mitchell Leventhal. She has used both a topical steroid and emollients without any improvement in her symptoms. She states the rash itches. She denies any constitutional symptoms or warmth associated with the lesion. She denies any trauma to the area. She denies any history of a burn or a splash of chemicals to the area. Relates the ulceration started again in July. She states she has been unable to tolerate her compression stockings very well. Wound/Ulcer Pain Timing/Severity: mild, patient relates it is more itchy than painful. She has tolerated her compression dressing well. Quality of pain: N/A  Severity:  0 / 10   Modifying Factors: None  Associated Signs/Symptoms: edema    Ulcer Identification:  Ulcer Type:  Dermatitis    Contributing Factors: edema and venous stasis    Acute Wound: N/A    PAST MEDICAL HISTORY        Diagnosis Date    Arthritis     Back pain     Headache        PAST SURGICAL HISTORY    Past Surgical History:   Procedure Laterality Date     SECTION         FAMILY HISTORY    History reviewed. No pertinent family history.     SOCIAL HISTORY    Social History     Tobacco Use Smoking status: Never    Smokeless tobacco: Never   Substance Use Topics    Alcohol use: No    Drug use: No       ALLERGIES    Allergies   Allergen Reactions    Amoxicillin     Penicillins        MEDICATIONS    Current Outpatient Medications on File Prior to Encounter   Medication Sig Dispense Refill    SUMAtriptan (IMITREX) 100 MG tablet Take 100 mg by mouth as needed      amitriptyline (ELAVIL) 25 MG tablet Take 25 mg by mouth nightly      cyclobenzaprine (FLEXERIL) 10 MG tablet Take 10 mg by mouth 3 times daily as needed for Muscle spasms      ketoconazole (NIZORAL) 2 % cream Apply topically bid to right foot lesion. 60 g 5    loratadine (CLARITIN) 10 MG tablet TAKE 1 TABLET EVERY DAY BY ORAL ROUTE AS NEEDED. naproxen (NAPROSYN) 500 MG tablet Take 1 tablet by mouth 2 times daily (with meals) (Patient not taking: Reported on 9/29/2022) 60 tablet 5     No current facility-administered medications on file prior to encounter. REVIEW OF SYSTEMS  Review of Systems    Pertinent items are noted in HPI. Review of Systems: A 12 point review of symptoms is unremarkable with the exception of the chief complaint. Patient specifically denies nausea, fever, vomiting, chills, shortness of breath, chest pain, abdominal pain, constipation or difficulty urinating. Objective:      /75   Pulse 89   Temp 97.2 °F (36.2 °C) (Infrared)   Resp 18   Wt 247 lb 12.8 oz (112.4 kg)   BMI 46.82 kg/m²     Wt Readings from Last 3 Encounters:   09/29/22 247 lb 12.8 oz (112.4 kg)   07/27/22 248 lb (112.5 kg)   06/23/22 244 lb 11.4 oz (111 kg)       PHYSICAL EXAM  Physical Exam    Patient has a palpable dorsalis pedis and posterior tibial pulse bilaterally. She has a normal hair growth pattern on her bilateral lower extremities. Capillary fill time is brisk to all digits. Patient has extensive varicose veins noted on her bilateral foot and legs.   There is moderate nonpitting edema noted on the bilateral lower extremities. There is a large plaque noted that is erythematous over the anterior lateral aspect of the right foot and ankle. There are dried crusty likely ruptured vesicles noted at the periphery of the plaque. There is no fluctuance or crepitus associated with this. The area is not warm to the touch. The punch biopsy sites have epithelialized. Patient's muscle strength is intact and symmetrical for dorsiflexors plantar flexors inverters and everters bilaterally. Patient has a mild to moderate bunion deformity noted bilaterally      Assessment:        Problem List Items Addressed This Visit       Acute dermatitis    Varicose veins of right lower extremity with inflammation, with ulcer of ankle limited to breakdown of skin ([facility] [address] - Primary    Relevant Orders    VL Reflux Skip Insufficiency Stdy Right           Plan:   E/M x30 minutes     Patient will be sent for a venous reflux study as she has extensive varicose veins on her bilateral lower extremities. As the ulceration previously improved significantly with multilayer compression dressings there is likely a component of venous insufficiency contributing to this chronic dermatitis/skin ulceration. A multilayer compression dressing was applied to the patient's right lower extremity. She was instructed to keep it clean dry and intact. Patient has significant venous disease. We will control her edema with multilayer compression dressing. Treatment Note please see attached Discharge Instructions    Written patient dismissal instructions given to patient and signed by patient or POA.          Reappoint 1 weeks for follow-up    Discharge [address] [place] [address] Physician Orders and Discharge Louie   [facility], [address] [place] [address]  Telephone: [phone]  [address] 8:00 am - 4:30 pm and Friday 8:00 am - 12:00 pm.        NAME: Aldean Ahumada  YOB: 1970  MEDICAL RECORD NUMBER:  [id number]  DATE:  9/29/2022    Important Reminders:   Please wash hands with soap and water before and after every dressing change. Do not scrub wounds. Keep wounds dry in shower unless otherwise instructed by the physician. SMOKING can slow would healing. Stop smoking as soon as possible to improve healing and prevent further complications associated with smoking. Sarah-Wound Topical Treatments:  Do not apply lotions, creams, or ointments to wound bed unless directed. [] Apply moisturizing lotion to skin surrounding the wound prior to dressing change.  [] Apply antifungal ointment to skin surrounding the wound prior to dressing change.  [] Apply thin film of no sting moisture barrier ointment to skin immediately around      wound. [] Other:       Wound Location: RIGHT ANTERIOR ANKLE        Primary Dressing:  [x] BETAMETHASONE   []     Secondary Dressing:  [x] OPTILOCK  []       Dressing Frequency:  []   [x] Do Not Change Dressing            Compression and Edema Control: COBAN 2 TO RIGHT LEG  [] Wear Home Compression Stockings   [] Spandagrip to:    Size: []Low compression 5-10 mm/Hg      []Medium compression 10-20 mm/Hg           []High compression  20-30 mm/Hg  [] Ace Wrap Toes to Knee to    [x] Multilayer Compression Wrap: Coban 2 to Right Leg   Do not get leg(s) with wrap wet. If wraps become too tight call the center or completely remove the wrap. Pressure Relief and Off Loading:  [] Off-loading when [] walking  [] in bed [] sitting   Turn every 2 hours when in bed   Avoid putting direct pressure on the site of the wound. Limit side lying to 30 degree tilt. Limit elevating the head of the bed greater than 30 degrees. [] Assistive Devices     Use as instructed by the provider      Activity: Activity as Tolerated      Dietary:   Continue your diet as tolerated.   Protein is a key nutrient in helping to repair damaged tissue and promote new tissue growth. Good sources of protein include milk, yogurt, cheese, fish, lean meat and beans. If you are DIABETIC, having diabetes can make it hard for wounds to heal. Try to keep your blood sugar within it's target range. Limit Sodium, Alcohol and Sugar. Pain:   Please Note some pain, drainage and/or bleeding might be expected after seeing the provider. TO HELP ALLEVIATE PAIN WE RECOMMEND THE FOLLOWING  Elevate the affected limb. Use over the counter medications as permitted by your family doctor. For Persistent Pain not relieved by the above interventions, please notify your family doctor. Return Appointment:  [x] Return Appointment: With DR HOLLOWAY  in 1 Week(s)  [] Wound and dressing supply provider:   [] ECF or Home Healthcare:  [] Wound Assessment: [] Physician or NP scheduled for Wound Assessment:   [x] Orders placed during your visit: VENOUS REFLUX STUDY TO RIGHT LEG - PLEASE CALL [phone] TO SCHEDULE. : SUSY     Electronically signed by Ciara Andrews RN on 9/29/2022 at 9:49 AM       [address] [place] [address] Information: Should you experience any significant changes in your wound(s) or have questions about your wound care, please contact the [address] [place] [address] at [address] 8:00 am - 4:30 pm and Friday 8:00 am - 12:30 pm.  If you need help with your wound outside these hours and cannot wait until we are again available, contact your PCP or go to the hospital emergency room. PLEASE NOTE: IF YOU ARE UNABLE TO OBTAIN WOUND SUPPLIES, CONTINUE TO USE THE SUPPLIES YOU HAVE AVAILABLE UNTIL YOU ARE ABLE TO REACH US. IT IS MOST IMPORTANT TO KEEP THE WOUND COVERED AT ALL TIMES.      Physician Signature:_______________________    Date: ___________ Time:  ____________          Dr Cole Lara                  Electronically signed by Cole Lara DPM on 9/30/2022 at 6:44 AM

## 2022-09-30 NOTE — DISCHARGE INSTRUCTIONS
[address] Physician Orders and Discharge Instructions  [place] [address], [address] [place] [address]  Telephone: [phone]  [address] 8:00 am - 4:30 pm and Friday 8:00 am - 12:00 pm.          NAME:  Hans Prader  YOB: 1970  MEDICAL RECORD NUMBER:  [id number]  DATE:  10/6/2022     Important Reminders:   Please wash hands with soap and water before and after every dressing change. Do not scrub wounds. Keep wounds dry in shower unless otherwise instructed by the physician. SMOKING can slow would healing. Stop smoking as soon as possible to improve healing and prevent further complications associated with smoking. Sarah-Wound Topical Treatments:  Do not apply lotions, creams, or ointments to wound bed unless directed. [] Apply moisturizing lotion to skin surrounding the wound prior to dressing change.  [] Apply antifungal ointment to skin surrounding the wound prior to dressing change.  [] Apply thin film of no sting moisture barrier ointment to skin immediately around      wound. [] Other:         Wound Location: RIGHT ANTERIOR ANKLE      Primary Dressing:  [x] BETAMETHASONE   []      Secondary Dressing:  [x] OPTILOCK  []         Dressing Frequency:  []   [x] Do Not Change Dressing           Compression and Edema Control: COBAN 2 TO RIGHT LEG  [] Wear Home Compression Stockings   [] Spandagrip to:    Size: []Low compression 5-10 mm/Hg                 []Medium compression 10-20 mm/Hg           []High compression  20-30 mm/Hg  [] Ace Wrap Toes to Knee to    [x] Multilayer Compression Wrap: Coban 2 to Right Leg              Do not get leg(s) with wrap wet. If wraps become too tight call the center or completely remove the wrap.                Pressure Relief and Off Loading:  [] Off-loading when   [] walking       [] in bed         [] sitting   Turn every 2 hours when in bed             Avoid Riddle.   putting direct pressure on the site of the wound. Limit side lying to 30 degree tilt. Limit elevating the head of the bed greater than 30 degrees. [] Assistive Devices     Use as instructed by the provider        Activity: Activity as Tolerated        Dietary:   Continue your diet as tolerated. Protein is a key nutrient in helping to repair damaged tissue and promote new tissue growth. Good sources of protein include milk, yogurt, cheese, fish, lean meat and beans. If you are DIABETIC, having diabetes can make it hard for wounds to heal. Try to keep your blood sugar within it's target range. Limit Sodium, Alcohol and Sugar. Pain:   Please Note some pain, drainage and/or bleeding might be expected after seeing the provider. TO HELP ALLEVIATE PAIN WE RECOMMEND THE FOLLOWING  Elevate the affected limb. Use over the counter medications as permitted by your family doctor. For Persistent Pain not relieved by the above interventions, please notify your family doctor. Return Appointment:  [x] Return Appointment: With DR HOLLOWAY  in 1 Week(s)  [] Wound and dressing supply provider:   [] ECF or Home Healthcare:  [] Wound Assessment:         [] Physician or NP scheduled for Wound Assessment:   [x] Orders placed during your visit: VENOUS REFLUX STUDY TO RIGHT LEG - PLEASE CALL [phone] TO SCHEDULE (10/28/2022). : SUSY      Electronically signed by Brittney Sahu RN on 10/6/2022 at 9:50 AM          [address] [place] [address] Information: Should you experience any significant changes in your wound(s) or have questions about your wound care, please contact the [facility] at [address] 8:00 am - 4:30 pm and Friday 8:00 am - 12:30 pm.  If you need help with your wound outside these hours and cannot wait until we are again available, contact your PCP or go to the hospital emergency room.       PLEASE NOTE: IF YOU ARE UNABLE TO OBTAIN WOUND SUPPLIES, CONTINUE TO USE THE SUPPLIES YOU HAVE AVAILABLE UNTIL YOU ARE ABLE TO REACH US. IT IS MOST IMPORTANT TO KEEP THE WOUND COVERED AT ALL TIMES.      Physician Signature:_______________________     Date: ___________ Time:  ____________                                  Dr Boston Jones

## 2022-10-06 ENCOUNTER — HOSPITAL ENCOUNTER (OUTPATIENT)
Dept: WOUND CARE | Age: 52
Discharge: HOME | End: 2022-10-06
Payer: MEDICAID

## 2022-10-06 VITALS
HEART RATE: 69 BPM | RESPIRATION RATE: 18 BRPM | DIASTOLIC BLOOD PRESSURE: 68 MMHG | SYSTOLIC BLOOD PRESSURE: 99 MMHG | TEMPERATURE: 97.1 F

## 2022-10-06 DIAGNOSIS — L97.311 VARICOSE VEINS OF RIGHT LOWER EXTREMITY WITH INFLAMMATION, WITH ULCER OF ANKLE LIMITED TO BREAKDOWN OF SKIN (HCC): ICD-10-CM

## 2022-10-06 DIAGNOSIS — L30.9 ACUTE DERMATITIS: Primary | ICD-10-CM

## 2022-10-06 DIAGNOSIS — I83.213 VARICOSE VEINS OF RIGHT LOWER EXTREMITY WITH INFLAMMATION, WITH ULCER OF ANKLE LIMITED TO BREAKDOWN OF SKIN (HCC): ICD-10-CM

## 2022-10-06 PROCEDURE — 29581 APPL MULTLAYER CMPRN SYS LEG: CPT

## 2022-10-06 RX ORDER — GINSENG 100 MG
CAPSULE ORAL ONCE
Status: CANCELLED | OUTPATIENT
Start: 2022-10-06 | End: 2022-10-06

## 2022-10-06 RX ORDER — LIDOCAINE HYDROCHLORIDE 20 MG/ML
JELLY TOPICAL ONCE
Status: CANCELLED | OUTPATIENT
Start: 2022-10-06 | End: 2022-10-06

## 2022-10-06 RX ORDER — LIDOCAINE 40 MG/G
CREAM TOPICAL ONCE
Status: CANCELLED | OUTPATIENT
Start: 2022-10-06 | End: 2022-10-06

## 2022-10-06 RX ORDER — CLOBETASOL PROPIONATE 0.5 MG/G
OINTMENT TOPICAL ONCE
Status: CANCELLED | OUTPATIENT
Start: 2022-10-06 | End: 2022-10-06

## 2022-10-06 RX ORDER — LIDOCAINE HYDROCHLORIDE 40 MG/ML
SOLUTION TOPICAL ONCE
Status: CANCELLED | OUTPATIENT
Start: 2022-10-06 | End: 2022-10-06

## 2022-10-06 RX ORDER — GENTAMICIN SULFATE 1 MG/G
OINTMENT TOPICAL ONCE
Status: CANCELLED | OUTPATIENT
Start: 2022-10-06 | End: 2022-10-06

## 2022-10-06 RX ORDER — BACITRACIN ZINC AND POLYMYXIN B SULFATE 500; 1000 [USP'U]/G; [USP'U]/G
OINTMENT TOPICAL ONCE
Status: CANCELLED | OUTPATIENT
Start: 2022-10-06 | End: 2022-10-06

## 2022-10-06 RX ORDER — BETAMETHASONE DIPROPIONATE 0.05 %
OINTMENT (GRAM) TOPICAL ONCE
Status: CANCELLED | OUTPATIENT
Start: 2022-10-06 | End: 2022-10-06

## 2022-10-06 RX ORDER — BACITRACIN, NEOMYCIN, POLYMYXIN B 400; 3.5; 5 [USP'U]/G; MG/G; [USP'U]/G
OINTMENT TOPICAL ONCE
Status: CANCELLED | OUTPATIENT
Start: 2022-10-06 | End: 2022-10-06

## 2022-10-06 RX ORDER — LIDOCAINE HYDROCHLORIDE 40 MG/ML
SOLUTION TOPICAL ONCE
Status: COMPLETED | OUTPATIENT
Start: 2022-10-06 | End: 2022-10-06

## 2022-10-06 RX ORDER — LIDOCAINE 50 MG/G
OINTMENT TOPICAL ONCE
Status: CANCELLED | OUTPATIENT
Start: 2022-10-06 | End: 2022-10-06

## 2022-10-06 RX ADMIN — LIDOCAINE HYDROCHLORIDE 5 ML: 40 SOLUTION TOPICAL at 09:27

## 2022-10-06 ASSESSMENT — PAIN DESCRIPTION - FREQUENCY: FREQUENCY: INTERMITTENT

## 2022-10-06 ASSESSMENT — PAIN DESCRIPTION - PAIN TYPE: TYPE: ACUTE PAIN

## 2022-10-06 ASSESSMENT — PAIN - FUNCTIONAL ASSESSMENT: PAIN_FUNCTIONAL_ASSESSMENT: ACTIVITIES ARE NOT PREVENTED

## 2022-10-06 ASSESSMENT — PAIN DESCRIPTION - ONSET: ONSET: GRADUAL

## 2022-10-06 ASSESSMENT — PAIN DESCRIPTION - DESCRIPTORS: DESCRIPTORS: ACHING;THROBBING

## 2022-10-06 ASSESSMENT — PAIN SCALES - GENERAL
PAINLEVEL_OUTOF10: 4
PAINLEVEL_OUTOF10: 0

## 2022-10-06 ASSESSMENT — PAIN DESCRIPTION - LOCATION: LOCATION: ANKLE

## 2022-10-06 ASSESSMENT — PAIN DESCRIPTION - ORIENTATION: ORIENTATION: RIGHT

## 2022-10-06 NOTE — PROGRESS NOTES
[address]   Progress Note and Procedure Note      [address] [facility] RECORD NUMBER:  [id number]  AGE: 50 y.o. GENDER: female  : 1970  EPISODE DATE:  10/6/2022    Subjective:     Chief Complaint   Patient presents with    Wound Check     Follow up right  leg         HISTORY of PRESENT ILLNESS HPI     Vanita Brady is a 50 y.o. female who presents today for wound/ulcer evaluation. History of Wound Context: Presents today for a \"sore\" on her right anterior dorsal foot and ankle. She relates it has been there for over 3 months. She has had treatment by her primary care physician as well as Dr. Tyesha Balderas. She has used both a topical steroid and emollients without any improvement in her symptoms. She states the rash itches. She denies any constitutional symptoms or warmth associated with the lesion. She denies any trauma to the area. She denies any history of a burn or a splash of chemicals to the area. Relates the ulceration started again in July. She states she has been unable to tolerate her compression stockings very well. Patient relates she has tolerated her multilayer compression dressing without any problems. Patient relates she is scheduled for her venous reflux study on . Wound/Ulcer Pain Timing/Severity: mild, patient relates it is more itchy than painful. She has tolerated her compression dressing well. Quality of pain: N/A  Severity:  0 / 10   Modifying Factors: None  Associated Signs/Symptoms: edema    Ulcer Identification:  Ulcer Type:  Dermatitis    Contributing Factors: edema and venous stasis    Acute Wound: N/A    PAST MEDICAL HISTORY        Diagnosis Date    Arthritis     Back pain     Headache        PAST SURGICAL HISTORY    Past Surgical History:   Procedure Laterality Date     SECTION         FAMILY HISTORY    History reviewed. No pertinent family history.     SOCIAL HISTORY    Social History     Tobacco Use    Smoking status: Never    Smokeless tobacco: Never   Substance Use Topics    Alcohol use: No    Drug use: No       ALLERGIES    Allergies   Allergen Reactions    Amoxicillin     Penicillins        MEDICATIONS    Current Outpatient Medications on File Prior to Encounter   Medication Sig Dispense Refill    SUMAtriptan (IMITREX) 100 MG tablet Take 100 mg by mouth as needed      amitriptyline (ELAVIL) 25 MG tablet Take 25 mg by mouth nightly      cyclobenzaprine (FLEXERIL) 10 MG tablet Take 10 mg by mouth 3 times daily as needed for Muscle spasms      ketoconazole (NIZORAL) 2 % cream Apply topically bid to right foot lesion. 60 g 5    loratadine (CLARITIN) 10 MG tablet TAKE 1 TABLET EVERY DAY BY ORAL ROUTE AS NEEDED. naproxen (NAPROSYN) 500 MG tablet Take 1 tablet by mouth 2 times daily (with meals) (Patient not taking: Reported on 9/29/2022) 60 tablet 5     No current facility-administered medications on file prior to encounter. REVIEW OF SYSTEMS  Review of Systems    Pertinent items are noted in HPI. Review of Systems: A 12 point review of symptoms is unremarkable with the exception of the chief complaint. Patient specifically denies nausea, fever, vomiting, chills, shortness of breath, chest pain, abdominal pain, constipation or difficulty urinating. Objective:      BP 99/68   Pulse 69   Temp 97.1 °F (36.2 °C)   Resp 18     Wt Readings from Last 3 Encounters:   09/29/22 247 lb 12.8 oz (112.4 kg)   07/27/22 248 lb (112.5 kg)   06/23/22 244 lb 11.4 oz (111 kg)       PHYSICAL EXAM  Physical Exam    Patient has a palpable dorsalis pedis and posterior tibial pulse bilaterally. She has a normal hair growth pattern on her bilateral lower extremities. Capillary fill time is brisk to all digits. Patient has extensive varicose veins noted on her bilateral foot and legs. There is moderate nonpitting edema noted on the bilateral lower extremities.   There is a large plaque noted that is erythematous over the anterior lateral aspect of the right foot and ankle. There are dried crusty likely ruptured vesicles noted at the periphery of the plaque. There is no fluctuance or crepitus associated with this. The area is not warm to the touch. The punch biopsy sites have epithelialized. Patient's muscle strength is intact and symmetrical for dorsiflexors plantar flexors inverters and everters bilaterally. Patient has a mild to moderate bunion deformity noted bilaterally      Assessment:        Problem List Items Addressed This Visit       Acute dermatitis - Primary    Relevant Orders    Initiate Outpatient Wound Care Protocol    Varicose veins of right lower extremity with inflammation, with ulcer of ankle limited to breakdown of skin [facility])    Relevant Orders    Initiate Outpatient Wound Care Protocol           Plan:   E/M x30 minutes     Patient will be sent for a venous reflux study as she has extensive varicose veins on her bilateral lower extremities. As the ulceration previously improved significantly with multilayer compression dressings there is likely a component of venous insufficiency contributing to this chronic dermatitis/skin ulceration. Encouraged patient to keep the appointment for this study. A multilayer compression dressing was applied to the patient's right lower extremity. She was instructed to keep it clean dry and intact. Patient has significant venous disease. We will control her edema with multilayer compression dressing. Treatment Note please see attached Discharge Instructions    Written patient dismissal instructions given to patient and signed by patient or POA.          Reappoint 1 weeks for follow-up    Discharge [id number] [facility] Physician Orders and Discharge Instructions  [facility]  [address] [facility]  [address]  Telephone: [phone] - THURSDAY 8:00 am - 4:30 pm and Friday 8:00 am - 12:00 pm.          NAME:  Siria Villalobos  YOB: 1970  MEDICAL RECORD NUMBER:  [id number]  DATE:  10/6/2022     Important Reminders:   Please wash hands with soap and water before and after every dressing change. Do not scrub wounds. Keep wounds dry in shower unless otherwise instructed by the physician. SMOKING can slow would healing. Stop smoking as soon as possible to improve healing and prevent further complications associated with smoking. Sarah-Wound Topical Treatments:  Do not apply lotions, creams, or ointments to wound bed unless directed. [] Apply moisturizing lotion to skin surrounding the wound prior to dressing change.  [] Apply antifungal ointment to skin surrounding the wound prior to dressing change.  [] Apply thin film of no sting moisture barrier ointment to skin immediately around      wound. [] Other:         Wound Location: RIGHT ANTERIOR ANKLE      Primary Dressing:  [x] BETAMETHASONE   []      Secondary Dressing:  [x] OPTILOCK  []         Dressing Frequency:  []   [x] Do Not Change Dressing           Compression and Edema Control: COBAN 2 TO RIGHT LEG  [] Wear Home Compression Stockings   [] Spandagrip to:    Size: []Low compression 5-10 mm/Hg                 []Medium compression 10-20 mm/Hg           []High compression  20-30 mm/Hg  [] Ace Wrap Toes to Knee to    [x] Multilayer Compression Wrap: Coban 2 to Right Leg              Do not get leg(s) with wrap wet. If wraps become too tight call the center or completely remove the wrap. Pressure Relief and Off Loading:  [] Off-loading when   [] walking       [] in bed         [] sitting   Turn every 2 hours when in bed             Avoid putting direct pressure on the site of the wound. Limit side lying to 30 degree tilt. Limit elevating the head of the bed greater than 30 degrees.                                                 [] Assistive Devices     Use as instructed by the provider        Activity: Activity as Tolerated        Dietary:   Continue your diet as tolerated. Protein is a key nutrient in helping to repair damaged tissue and promote new tissue growth. Good sources of protein include milk, yogurt, cheese, fish, lean meat and beans. If you are DIABETIC, having diabetes can make it hard for wounds to heal. Try to keep your blood sugar within it's target range. Limit Sodium, Alcohol and Sugar. Pain:   Please Note some pain, drainage and/or bleeding might be expected after seeing the provider. TO HELP ALLEVIATE PAIN WE RECOMMEND THE FOLLOWING  Elevate the affected limb. Use over the counter medications as permitted by your family doctor. For Persistent Pain not relieved by the above interventions, please notify your family doctor. Return Appointment:  [x] Return Appointment: With DR HOLLOWAY  in 1 Week(s)  [] Wound and dressing supply provider:   [] ECF or Home Healthcare:  [] Wound Assessment:         [] Physician or NP scheduled for Wound Assessment:   [x] Orders placed during your visit: VENOUS REFLUX STUDY TO RIGHT LEG - PLEASE CALL [phone] TO SCHEDULE (10/28/2022). : SUSY      Electronically signed by Francisco Weiss RN on 10/6/2022 at 9:50 AM          [facility] Information: Should you experience any significant changes in your wound(s) or have questions about your wound care, please contact the [address] [place] [address] at [address] 8:00 am - 4:30 pm and Friday 8:00 am - 12:30 pm.  If you need help with your wound outside these hours and cannot wait until we are again available, contact your PCP or go to the hospital emergency room. PLEASE NOTE: IF YOU ARE UNABLE TO OBTAIN WOUND SUPPLIES, CONTINUE TO USE THE SUPPLIES YOU HAVE AVAILABLE UNTIL YOU ARE ABLE TO REACH US. IT IS MOST IMPORTANT TO KEEP THE WOUND COVERED AT ALL TIMES.      Physician Signature:_______________________     Date: ___________ Time:  ____________                                  Dr Charlene García         Electronically signed by Charlene García DPM on 10/6/2022 at 9:59 AM

## 2022-10-06 NOTE — PLAN OF CARE
Multilayer Compression Wrap   (Not Unna) Below the Knee    NAME:  Pan Galvin OF BIRTH:  1970  MEDICAL RECORD NUMBER:  [id number]  DATE:  10/6/2022    Multilayer compression wrap: Removed old Multilayer wrap if indicated and wash leg with mild soap/water. Applied moisturizing agent to dry skin as needed. Applied primary and secondary dressing as ordered. Applied multilayered dressing below the knee to right lower leg. Instructed patient/caregiver not to remove dressing and to keep it clean and dry. Instructed patient/caregiver on complications to report to provider, such as pain, numbness in toes, heavy drainage, and slippage of dressing. Instructed patient on purpose of compression dressing and on activity and exercise recommendations.       Electronically signed by Gerald Nash RN on 10/6/2022 at 10:13 AM

## 2022-10-07 NOTE — DISCHARGE INSTRUCTIONS
[address] Physician Orders and Discharge Instructions  [address], [address] [place] [address]  Telephone: [phone]  [address] 8:00 am - 4:30 pm and Friday 8:00 am - 12:00 pm.          NAME:  Vanita Brady  YOB: 1970  MEDICAL RECORD NUMBER:  [id number]  DATE:  10/13/2022     Important Reminders:   Please wash hands with soap and water before and after every dressing change. Do not scrub wounds. Keep wounds dry in shower unless otherwise instructed by the physician. SMOKING can slow would healing. Stop smoking as soon as possible to improve healing and prevent further complications associated with smoking. Sarah-Wound Topical Treatments:  Do not apply lotions, creams, or ointments to wound bed unless directed. [] Apply moisturizing lotion to skin surrounding the wound prior to dressing change.  [] Apply antifungal ointment to skin surrounding the wound prior to dressing change.  [] Apply thin film of no sting moisture barrier ointment to skin immediately around      wound. [] Other:         Wound Location: RIGHT ANTERIOR ANKLE      Primary Dressing:  [x] BETAMETHASONE   []      Secondary Dressing:  [x] OPTILOCK  []         Dressing Frequency:  []   [x] Do Not Change Dressing           Compression and Edema Control: COBAN 2 TO RIGHT LEG  [] Wear Home Compression Stockings   [] Spandagrip to:    Size: []Low compression 5-10 mm/Hg                 []Medium compression 10-20 mm/Hg           []High compression  20-30 mm/Hg  [] Ace Wrap Toes to Knee to    [x] Multilayer Compression Wrap: Coban 2 to Right Leg              Do not get leg(s) with wrap wet. If wraps become too tight call the center or completely remove the wrap.                Pressure Relief and Off Loading:  [] Off-loading when   [] walking       [] in bed         [] sitting   Turn every 2 hours when in bed             Avoid putting direct pressure on the site of the wound. Limit side lying to 30 degree tilt. Limit elevating the head of the bed greater than 30 degrees. [] Assistive Devices     Use as instructed by the provider        Activity: Activity as Tolerated        Dietary:   Continue your diet as tolerated. Protein is a key nutrient in helping to repair damaged tissue and promote new tissue growth. Good sources of protein include milk, yogurt, cheese, fish, lean meat and beans. If you are DIABETIC, having diabetes can make it hard for wounds to heal. Try to keep your blood sugar within it's target range. Limit Sodium, Alcohol and Sugar. Pain:   Please Note some pain, drainage and/or bleeding might be expected after seeing the provider. TO HELP ALLEVIATE PAIN WE RECOMMEND THE FOLLOWING  Elevate the affected limb. Use over the counter medications as permitted by your family doctor. For Persistent Pain not relieved by the above interventions, please notify your family doctor. Return Appointment:  [x] Return Appointment: With DR HOLLOWAY  in 1 Week(s)  [] Wound and dressing supply provider:   [] ECF or Home Healthcare:  [] Wound Assessment:         [] Physician or NP scheduled for Wound Assessment:   [x] Orders placed during your visit: VENOUS REFLUX STUDY TO RIGHT LEG - PLEASE CALL [phone] TO SCHEDULE (10/28/2022). : SUSY      Electronically signed by Ayan Hanley RN on 10/13/2022 at 10:23 AM             [address] [facility] [address] Information: Should you experience any significant changes in your wound(s) or have questions about your wound care, please contact the [address] [place] [address] at [address] 8:00 am - 4:30 pm and Friday 8:00 am - 12:30 pm.  If you need help with your wound outside these hours and cannot wait until we are again available, contact your PCP or go to the hospital emergency room.       PLEASE NOTE: IF YOU ARE UNABLE TO OBTAIN WOUND SUPPLIES, CONTINUE TO USE THE SUPPLIES YOU HAVE AVAILABLE UNTIL YOU ARE ABLE TO REACH US. IT IS MOST IMPORTANT TO KEEP THE WOUND COVERED AT ALL TIMES.      Physician Signature:_______________________     Date: ___________ Time:  ____________                                  Dr Fabián Martini No

## 2022-10-13 ENCOUNTER — HOSPITAL ENCOUNTER (OUTPATIENT)
Dept: WOUND CARE | Age: 52
Discharge: HOME | End: 2022-10-13
Payer: MEDICAID

## 2022-10-13 VITALS
TEMPERATURE: 96.8 F | RESPIRATION RATE: 18 BRPM | SYSTOLIC BLOOD PRESSURE: 111 MMHG | DIASTOLIC BLOOD PRESSURE: 66 MMHG | HEART RATE: 75 BPM

## 2022-10-13 DIAGNOSIS — L97.311 VARICOSE VEINS OF RIGHT LOWER EXTREMITY WITH INFLAMMATION, WITH ULCER OF ANKLE LIMITED TO BREAKDOWN OF SKIN (HCC): Primary | ICD-10-CM

## 2022-10-13 DIAGNOSIS — L30.9 ACUTE DERMATITIS: ICD-10-CM

## 2022-10-13 DIAGNOSIS — I83.213 VARICOSE VEINS OF RIGHT LOWER EXTREMITY WITH INFLAMMATION, WITH ULCER OF ANKLE LIMITED TO BREAKDOWN OF SKIN (HCC): Primary | ICD-10-CM

## 2022-10-13 PROCEDURE — 29581 APPL MULTLAYER CMPRN SYS LEG: CPT

## 2022-10-13 RX ORDER — LIDOCAINE HYDROCHLORIDE 20 MG/ML
JELLY TOPICAL ONCE
Status: CANCELLED | OUTPATIENT
Start: 2022-10-13 | End: 2022-10-13

## 2022-10-13 RX ORDER — CLOBETASOL PROPIONATE 0.5 MG/G
OINTMENT TOPICAL ONCE
Status: CANCELLED | OUTPATIENT
Start: 2022-10-13 | End: 2022-10-13

## 2022-10-13 RX ORDER — BACITRACIN, NEOMYCIN, POLYMYXIN B 400; 3.5; 5 [USP'U]/G; MG/G; [USP'U]/G
OINTMENT TOPICAL ONCE
Status: CANCELLED | OUTPATIENT
Start: 2022-10-13 | End: 2022-10-13

## 2022-10-13 RX ORDER — LIDOCAINE 40 MG/G
CREAM TOPICAL ONCE
Status: CANCELLED | OUTPATIENT
Start: 2022-10-13 | End: 2022-10-13

## 2022-10-13 RX ORDER — BETAMETHASONE DIPROPIONATE 0.05 %
OINTMENT (GRAM) TOPICAL ONCE
Status: CANCELLED | OUTPATIENT
Start: 2022-10-13 | End: 2022-10-13

## 2022-10-13 RX ORDER — LIDOCAINE HYDROCHLORIDE 40 MG/ML
SOLUTION TOPICAL ONCE
Status: COMPLETED | OUTPATIENT
Start: 2022-10-13 | End: 2022-10-13

## 2022-10-13 RX ORDER — GENTAMICIN SULFATE 1 MG/G
OINTMENT TOPICAL ONCE
Status: CANCELLED | OUTPATIENT
Start: 2022-10-13 | End: 2022-10-13

## 2022-10-13 RX ORDER — GINSENG 100 MG
CAPSULE ORAL ONCE
Status: CANCELLED | OUTPATIENT
Start: 2022-10-13 | End: 2022-10-13

## 2022-10-13 RX ORDER — BACITRACIN ZINC AND POLYMYXIN B SULFATE 500; 1000 [USP'U]/G; [USP'U]/G
OINTMENT TOPICAL ONCE
Status: CANCELLED | OUTPATIENT
Start: 2022-10-13 | End: 2022-10-13

## 2022-10-13 RX ORDER — LIDOCAINE 50 MG/G
OINTMENT TOPICAL ONCE
Status: CANCELLED | OUTPATIENT
Start: 2022-10-13 | End: 2022-10-13

## 2022-10-13 RX ORDER — LIDOCAINE HYDROCHLORIDE 40 MG/ML
SOLUTION TOPICAL ONCE
Status: CANCELLED | OUTPATIENT
Start: 2022-10-13 | End: 2022-10-13

## 2022-10-13 RX ADMIN — LIDOCAINE HYDROCHLORIDE: 40 SOLUTION TOPICAL at 09:47

## 2022-10-13 ASSESSMENT — PAIN SCALES - GENERAL
PAINLEVEL_OUTOF10: 0
PAINLEVEL_OUTOF10: 0

## 2022-10-13 NOTE — PLAN OF CARE
Multilayer Compression Wrap   (Not Unna) Below the Knee    NAME:  Sanju Shock OF BIRTH:  1970  MEDICAL RECORD NUMBER:  [id number]  DATE:  10/13/2022    Multilayer compression wrap: Applied moisturizing agent to dry skin as needed. Applied primary and secondary dressing as ordered. Applied multilayered dressing below the knee to right lower leg. Instructed patient/caregiver not to remove dressing and to keep it clean and dry. Instructed patient/caregiver on complications to report to provider, such as pain, numbness in toes, heavy drainage, and slippage of dressing. Instructed patient on purpose of compression dressing and on activity and exercise recommendations.       Electronically signed by Dash Jones RN on 10/13/2022 at 11:39 AM

## 2022-10-13 NOTE — PROGRESS NOTES
[address]   Progress Note and Procedure Note      [facility] RECORD NUMBER:  [id number]  AGE: 50 y.o. GENDER: female  : 1970  EPISODE DATE:  10/13/2022    Subjective:     Chief Complaint   Patient presents with    Wound Check     Follow up for right lower leg wound. HISTORY of PRESENT ILLNESS HPI     Sergio Thornton is a 50 y.o. female who presents today for wound/ulcer evaluation. History of Wound Context: Presents today for a \"sore\" on her right anterior dorsal foot and ankle. She relates it has been there for over 3 months. She has had treatment by her primary care physician as well as Dr. Juanito Ortiz. She has used both a topical steroid and emollients without any improvement in her symptoms. She states the rash itches. She denies any constitutional symptoms or warmth associated with the lesion. She denies any trauma to the area. She denies any history of a burn or a splash of chemicals to the area. Relates the ulceration started again in July. She states she has been unable to tolerate her compression stockings very well. Patient relates she has tolerated her multilayer compression dressing without any problems. Patient relates she is scheduled for her venous reflux study on . Wound/Ulcer Pain Timing/Severity: mild, patient relates it is more itchy than painful. She has tolerated her compression dressing well. Quality of pain: N/A  Severity:  0 / 10   Modifying Factors: None  Associated Signs/Symptoms: edema    Ulcer Identification:  Ulcer Type:  Dermatitis    Contributing Factors: edema and venous stasis    Acute Wound: N/A    PAST MEDICAL HISTORY        Diagnosis Date    Arthritis     Back pain     Headache        PAST SURGICAL HISTORY    Past Surgical History:   Procedure Laterality Date     SECTION         FAMILY HISTORY    History reviewed. No pertinent family history.     SOCIAL HISTORY    Social History     Tobacco Use    Smoking status: Never    Smokeless tobacco: Never   Substance Use Topics    Alcohol use: No    Drug use: No       ALLERGIES    Allergies   Allergen Reactions    Amoxicillin     Penicillins        MEDICATIONS    Current Outpatient Medications on File Prior to Encounter   Medication Sig Dispense Refill    SUMAtriptan (IMITREX) 100 MG tablet Take 100 mg by mouth as needed      amitriptyline (ELAVIL) 25 MG tablet Take 25 mg by mouth nightly      cyclobenzaprine (FLEXERIL) 10 MG tablet Take 10 mg by mouth 3 times daily as needed for Muscle spasms      ketoconazole (NIZORAL) 2 % cream Apply topically bid to right foot lesion. 60 g 5    loratadine (CLARITIN) 10 MG tablet TAKE 1 TABLET EVERY DAY BY ORAL ROUTE AS NEEDED. naproxen (NAPROSYN) 500 MG tablet Take 1 tablet by mouth 2 times daily (with meals) (Patient not taking: Reported on 9/29/2022) 60 tablet 5     No current facility-administered medications on file prior to encounter. REVIEW OF SYSTEMS  Review of Systems    Pertinent items are noted in HPI. Review of Systems: A 12 point review of symptoms is unremarkable with the exception of the chief complaint. Patient specifically denies nausea, fever, vomiting, chills, shortness of breath, chest pain, abdominal pain, constipation or difficulty urinating. Objective:      /66   Pulse 75   Temp 96.8 °F (36 °C) (Infrared)   Resp 18     Wt Readings from Last 3 Encounters:   09/29/22 247 lb 12.8 oz (112.4 kg)   07/27/22 248 lb (112.5 kg)   06/23/22 244 lb 11.4 oz (111 kg)       PHYSICAL EXAM  Physical Exam    Patient has a palpable dorsalis pedis and posterior tibial pulse bilaterally. She has a normal hair growth pattern on her bilateral lower extremities. Capillary fill time is brisk to all digits. Patient has extensive varicose veins noted on her bilateral foot and legs. There is moderate nonpitting edema noted on the bilateral lower extremities.   There is a large plaque noted that is erythematous over the anterior lateral aspect of the right foot and ankle. There are dried crusty likely ruptured vesicles noted at the periphery of the plaque. There is no fluctuance or crepitus associated with this. The area is not warm to the touch. Patient's muscle strength is intact and symmetrical for dorsiflexors plantar flexors inverters and everters bilaterally. Patient has a mild to moderate bunion deformity noted bilaterally      Assessment:        Problem List Items Addressed This Visit       Acute dermatitis    Relevant Orders    Initiate Outpatient Wound Care Protocol    Varicose veins of right lower extremity with inflammation, with ulcer of ankle limited to breakdown of skin ([facility] - Primary    Relevant Orders    Initiate Outpatient Wound Care Protocol           Plan:   E/M x30 minutes     Patient will be sent for a venous reflux study as she has extensive varicose veins on her bilateral lower extremities. As the ulceration previously improved significantly with multilayer compression dressings there is likely a component of venous insufficiency contributing to this chronic dermatitis/skin ulceration. Encouraged patient to keep the appointment for this study. A multilayer compression dressing was applied to the patient's right lower extremity. She was instructed to keep it clean dry and intact. Patient has significant venous disease. We will control her edema with multilayer compression dressing. Treatment Note please see attached Discharge Instructions    Written patient dismissal instructions given to patient and signed by patient or POA.          Reappoint 1 weeks for follow-up    Discharge [address] [place] [address] Physician Orders and Discharge Louie [address] [place] [address], [facility]. [address], [address]  Telephone: [phone]  [address] 8:00 am - 4:30 pm and Friday 8:00 am - 12:00 pm.          NAME:  Sydnee Carreno  YOB: 1970  MEDICAL RECORD NUMBER:  [id number]  DATE:  10/13/2022     Important Reminders:   Please wash hands with soap and water before and after every dressing change. Do not scrub wounds. Keep wounds dry in shower unless otherwise instructed by the physician. SMOKING can slow would healing. Stop smoking as soon as possible to improve healing and prevent further complications associated with smoking. Sarah-Wound Topical Treatments:  Do not apply lotions, creams, or ointments to wound bed unless directed. [] Apply moisturizing lotion to skin surrounding the wound prior to dressing change.  [] Apply antifungal ointment to skin surrounding the wound prior to dressing change.  [] Apply thin film of no sting moisture barrier ointment to skin immediately around      wound. [] Other:         Wound Location: RIGHT ANTERIOR ANKLE      Primary Dressing:  [x] BETAMETHASONE   []      Secondary Dressing:  [x] OPTILOCK  []         Dressing Frequency:  []   [x] Do Not Change Dressing           Compression and Edema Control: COBAN 2 TO RIGHT LEG  [] Wear Home Compression Stockings   [] Spandagrip to:    Size: []Low compression 5-10 mm/Hg                 []Medium compression 10-20 mm/Hg           []High compression  20-30 mm/Hg  [] Ace Wrap Toes to Knee to    [x] Multilayer Compression Wrap: Coban 2 to Right Leg              Do not get leg(s) with wrap wet. If wraps become too tight call the center or completely remove the wrap. Pressure Relief and Off Loading:  [] Off-loading when   [] walking       [] in bed         [] sitting   Turn every 2 hours when in bed             Avoid putting direct pressure on the site of the wound. Limit side lying to 30 degree tilt. Limit elevating the head of the bed greater than 30 degrees.                                                 [] Assistive Devices     Use as instructed by the provider        Activity: Activity as Tolerated        Dietary:   Continue your diet as tolerated. Protein is a key nutrient in helping to repair damaged tissue and promote new tissue growth. Good sources of protein include milk, yogurt, cheese, fish, lean meat and beans. If you are DIABETIC, having diabetes can make it hard for wounds to heal. Try to keep your blood sugar within it's target range. Limit Sodium, Alcohol and Sugar. Pain:   Please Note some pain, drainage and/or bleeding might be expected after seeing the provider. TO HELP ALLEVIATE PAIN WE RECOMMEND THE FOLLOWING  Elevate the affected limb. Use over the counter medications as permitted by your family doctor. For Persistent Pain not relieved by the above interventions, please notify your family doctor. Return Appointment:  [x] Return Appointment: With DR HOLLOWAY  in 1 Week(s)  [] Wound and dressing supply provider:   [] ECF or Home Healthcare:  [] Wound Assessment:         [] Physician or NP scheduled for Wound Assessment:   [x] Orders placed during your visit: VENOUS REFLUX STUDY TO RIGHT LEG - PLEASE CALL [phone] TO SCHEDULE (10/28/2022). : SUSY      Electronically signed by Nathalia Ford RN on 10/13/2022 at 10:23 AM             [facility] Information: Should you experience any significant changes in your wound(s) or have questions about your wound care, please contact the [address] [place] [address] at [address] 8:00 am - 4:30 pm and Friday 8:00 am - 12:30 pm.  If you need help with your wound outside these hours and cannot wait until we are again available, contact your PCP or go to the hospital emergency room. PLEASE NOTE: IF YOU ARE UNABLE TO OBTAIN WOUND SUPPLIES, CONTINUE TO USE THE SUPPLIES YOU HAVE AVAILABLE UNTIL YOU ARE ABLE TO REACH US. IT IS MOST IMPORTANT TO KEEP THE WOUND COVERED AT ALL TIMES.      Physician Signature:_______________________     Date: ___________ Time:  ____________                                  Dr Kylie Carballo         Electronically signed by Kylie Carballo DPM on 10/13/2022 at 11:19 AM

## 2022-10-17 NOTE — DISCHARGE INSTRUCTIONS
[address] Physician Orders and Discharge Instructions  [place] [id number] [facility], [address] [place] [address]  Telephone: [phone]  [address] 8:00 am - 4:30 pm and Friday 8:00 am - 12:00 pm.          NAME:  Jesus Wick  YOB: 1970  MEDICAL RECORD NUMBER:  [id number]  DATE:  10/20/2022     Important Reminders:   Please wash hands with soap and water before and after every dressing change. Do not scrub wounds. Keep wounds dry in shower unless otherwise instructed by the physician. SMOKING can slow would healing. Stop smoking as soon as possible to improve healing and prevent further complications associated with smoking. Sarah-Wound Topical Treatments:  Do not apply lotions, creams, or ointments to wound bed unless directed. [] Apply moisturizing lotion to skin surrounding the wound prior to dressing change.  [] Apply antifungal ointment to skin surrounding the wound prior to dressing change.  [] Apply thin film of no sting moisture barrier ointment to skin immediately around      wound. [] Other:         Wound Location: RIGHT ANTERIOR ANKLE      Primary Dressing:  [x] BETAMETHASONE   []      Secondary Dressing:  [x] OPTILOCK  []         Dressing Frequency:  []   [x] Do Not Change Dressing           Compression and Edema Control: COBAN 2 TO RIGHT LEG  [] Wear Home Compression Stockings   [] Spandagrip to:    Size: []Low compression 5-10 mm/Hg                 []Medium compression 10-20 mm/Hg           []High compression  20-30 mm/Hg  [] Ace Wrap Toes to Knee to    [x] Multilayer Compression Wrap: Coban 2 to Right Leg              Do not get leg(s) with wrap wet. If wraps become too tight call the center or completely remove the wrap.                Pressure Relief and Off Loading:  [] Off-loading when   [] walking       [] in bed         [] sitting   Turn every 2 hours when in bed             Avoid putting direct pressure on the site of the wound. Limit side lying to 30 degree tilt. Limit elevating the head of the bed greater than 30 degrees. [] Assistive Devices     Use as instructed by the provider        Activity: Activity as Tolerated        Dietary:   Continue your diet as tolerated. Protein is a key nutrient in helping to repair damaged tissue and promote new tissue growth. Good sources of protein include milk, yogurt, cheese, fish, lean meat and beans. If you are DIABETIC, having diabetes can make it hard for wounds to heal. Try to keep your blood sugar within it's target range. Limit Sodium, Alcohol and Sugar. Pain:   Please Note some pain, drainage and/or bleeding might be expected after seeing the provider. TO HELP ALLEVIATE PAIN WE RECOMMEND THE FOLLOWING  Elevate the affected limb. Use over the counter medications as permitted by your family doctor. For Persistent Pain not relieved by the above interventions, please notify your family doctor. Return Appointment:  [x] Return Appointment: With DR HOLLOWAY  in 1 Week(s)  [] Wound and dressing supply provider:   [] ECF or Home Healthcare:  [] Wound Assessment:         [] Physician or NP scheduled for Wound Assessment:   [x] Orders placed during your visit: VENOUS REFLUX STUDY TO RIGHT LEG - PLEASE CALL [phone] TO SCHEDULE (10/28/2022). : SUSY      Electronically signed by Jessee Gimenez RN on 10/20/2022 at 9:38 AM          [address] Information: Should you experience any significant changes in your wound(s) or have questions about your wound care, please contact the [address] [place] [address] at [address] 8:00 am - 4:30 pm and Friday 8:00 am - 12:30 pm.  If you need help with your wound outside these hours and cannot wait until we are again available, contact your PCP or go to the hospital emergency room.       PLEASE NOTE: IF YOU ARE UNABLE TO OBTAIN WOUND SUPPLIES, CONTINUE TO USE THE SUPPLIES YOU HAVE AVAILABLE UNTIL YOU ARE ABLE TO REACH US. IT IS MOST IMPORTANT TO KEEP THE WOUND COVERED AT ALL TIMES.      Physician Signature:_______________________     Date: ___________ Time:  ____________                                  Dr Ruth Ann Kohli

## 2022-10-20 ENCOUNTER — HOSPITAL ENCOUNTER (OUTPATIENT)
Dept: WOUND CARE | Age: 52
Discharge: HOME | End: 2022-10-20
Payer: MEDICAID

## 2022-10-20 VITALS
SYSTOLIC BLOOD PRESSURE: 121 MMHG | DIASTOLIC BLOOD PRESSURE: 78 MMHG | HEART RATE: 70 BPM | TEMPERATURE: 96.8 F | RESPIRATION RATE: 18 BRPM

## 2022-10-20 DIAGNOSIS — I83.213 VARICOSE VEINS OF RIGHT LOWER EXTREMITY WITH INFLAMMATION, WITH ULCER OF ANKLE LIMITED TO BREAKDOWN OF SKIN (HCC): Primary | ICD-10-CM

## 2022-10-20 DIAGNOSIS — L30.9 ACUTE DERMATITIS: ICD-10-CM

## 2022-10-20 DIAGNOSIS — L97.311 VARICOSE VEINS OF RIGHT LOWER EXTREMITY WITH INFLAMMATION, WITH ULCER OF ANKLE LIMITED TO BREAKDOWN OF SKIN (HCC): Primary | ICD-10-CM

## 2022-10-20 PROCEDURE — 29581 APPL MULTLAYER CMPRN SYS LEG: CPT

## 2022-10-20 RX ORDER — GINSENG 100 MG
CAPSULE ORAL ONCE
Status: CANCELLED | OUTPATIENT
Start: 2022-10-20 | End: 2022-10-20

## 2022-10-20 RX ORDER — LIDOCAINE HYDROCHLORIDE 20 MG/ML
JELLY TOPICAL ONCE
Status: CANCELLED | OUTPATIENT
Start: 2022-10-20 | End: 2022-10-20

## 2022-10-20 RX ORDER — GENTAMICIN SULFATE 1 MG/G
OINTMENT TOPICAL ONCE
Status: CANCELLED | OUTPATIENT
Start: 2022-10-20 | End: 2022-10-20

## 2022-10-20 RX ORDER — BACITRACIN ZINC AND POLYMYXIN B SULFATE 500; 1000 [USP'U]/G; [USP'U]/G
OINTMENT TOPICAL ONCE
Status: CANCELLED | OUTPATIENT
Start: 2022-10-20 | End: 2022-10-20

## 2022-10-20 RX ORDER — LIDOCAINE HYDROCHLORIDE 40 MG/ML
SOLUTION TOPICAL ONCE
Status: CANCELLED | OUTPATIENT
Start: 2022-10-20 | End: 2022-10-20

## 2022-10-20 RX ORDER — LIDOCAINE 40 MG/G
CREAM TOPICAL ONCE
Status: CANCELLED | OUTPATIENT
Start: 2022-10-20 | End: 2022-10-20

## 2022-10-20 RX ORDER — BETAMETHASONE DIPROPIONATE 0.05 %
OINTMENT (GRAM) TOPICAL ONCE
Status: CANCELLED | OUTPATIENT
Start: 2022-10-20 | End: 2022-10-20

## 2022-10-20 RX ORDER — LIDOCAINE HYDROCHLORIDE 40 MG/ML
SOLUTION TOPICAL ONCE
Status: DISCONTINUED | OUTPATIENT
Start: 2022-10-20 | End: 2022-10-21 | Stop reason: HOSPADM

## 2022-10-20 RX ORDER — BACITRACIN, NEOMYCIN, POLYMYXIN B 400; 3.5; 5 [USP'U]/G; MG/G; [USP'U]/G
OINTMENT TOPICAL ONCE
Status: CANCELLED | OUTPATIENT
Start: 2022-10-20 | End: 2022-10-20

## 2022-10-20 RX ORDER — LIDOCAINE 50 MG/G
OINTMENT TOPICAL ONCE
Status: CANCELLED | OUTPATIENT
Start: 2022-10-20 | End: 2022-10-20

## 2022-10-20 RX ORDER — CLOBETASOL PROPIONATE 0.5 MG/G
OINTMENT TOPICAL ONCE
Status: CANCELLED | OUTPATIENT
Start: 2022-10-20 | End: 2022-10-20

## 2022-10-20 ASSESSMENT — PAIN SCALES - GENERAL
PAINLEVEL_OUTOF10: 0
PAINLEVEL_OUTOF10: 0

## 2022-10-20 NOTE — PROGRESS NOTES
[address]   Progress Note and Procedure Note      [facility] RECORD NUMBER:  [id number]  AGE: 50 y.o. GENDER: female  : 1970  EPISODE DATE:  10/20/2022    Subjective:     No chief complaint on file. HISTORY of PRESENT ILLNESS HPI     Vanita Brady is a 50 y.o. female who presents today for wound/ulcer evaluation. History of Wound Context: Presents today for a \"sore\" on her right anterior dorsal foot and ankle. She relates it has been there for over 3 months. She has had treatment by her primary care physician as well as Dr. Keely Auguste. She has used both a topical steroid and emollients without any improvement in her symptoms. She states the rash itches. She denies any constitutional symptoms or warmth associated with the lesion. She denies any trauma to the area. She denies any history of a burn or a splash of chemicals to the area. Relates the ulceration started again in July. She states she has been unable to tolerate her compression stockings very well. Patient relates she has tolerated her multilayer compression dressing without any problems. Patient relates she is scheduled for her venous reflux study on . Wound/Ulcer Pain Timing/Severity: mild, patient relates it is more itchy than painful. She has tolerated her compression dressing well. Quality of pain: N/A  Severity:  0 / 10   Modifying Factors: None  Associated Signs/Symptoms: edema    Ulcer Identification:  Ulcer Type:  Dermatitis    Contributing Factors: edema and venous stasis    Acute Wound: N/A    PAST MEDICAL HISTORY        Diagnosis Date    Arthritis     Back pain     Headache        PAST SURGICAL HISTORY    Past Surgical History:   Procedure Laterality Date     SECTION         FAMILY HISTORY    History reviewed. No pertinent family history.     SOCIAL HISTORY    Social History     Tobacco Use    Smoking status: Never    Smokeless tobacco: Never   Substance Use Topics    Alcohol use: No    Drug use: No       ALLERGIES    Allergies   Allergen Reactions    Amoxicillin     Penicillins        MEDICATIONS    Current Outpatient Medications on File Prior to Encounter   Medication Sig Dispense Refill    SUMAtriptan (IMITREX) 100 MG tablet Take 100 mg by mouth as needed      amitriptyline (ELAVIL) 25 MG tablet Take 25 mg by mouth nightly      cyclobenzaprine (FLEXERIL) 10 MG tablet Take 10 mg by mouth 3 times daily as needed for Muscle spasms      ketoconazole (NIZORAL) 2 % cream Apply topically bid to right foot lesion. 60 g 5    loratadine (CLARITIN) 10 MG tablet TAKE 1 TABLET EVERY DAY BY ORAL ROUTE AS NEEDED. naproxen (NAPROSYN) 500 MG tablet Take 1 tablet by mouth 2 times daily (with meals) (Patient not taking: Reported on 9/29/2022) 60 tablet 5     No current facility-administered medications on file prior to encounter. REVIEW OF SYSTEMS  Review of Systems    Pertinent items are noted in HPI. Review of Systems: A 12 point review of symptoms is unremarkable with the exception of the chief complaint. Patient specifically denies nausea, fever, vomiting, chills, shortness of breath, chest pain, abdominal pain, constipation or difficulty urinating. Objective:      /78   Pulse 70   Temp 96.8 °F (36 °C) (Infrared)   Resp 18     Wt Readings from Last 3 Encounters:   09/29/22 247 lb 12.8 oz (112.4 kg)   07/27/22 248 lb (112.5 kg)   06/23/22 244 lb 11.4 oz (111 kg)       PHYSICAL EXAM  Physical Exam    Patient has a palpable dorsalis pedis and posterior tibial pulse bilaterally. She has a normal hair growth pattern on her bilateral lower extremities. Capillary fill time is brisk to all digits. Patient has extensive varicose veins noted on her bilateral foot and legs. There is moderate nonpitting edema noted on the bilateral lower extremities.   There is a large plaque noted that is erythematous over the anterior lateral aspect of the right foot and ankle. There are dried crusty likely ruptured vesicles noted at the periphery of the plaque. There is no fluctuance or crepitus associated with this. The area is not warm to the touch. Patient's muscle strength is intact and symmetrical for dorsiflexors plantar flexors inverters and everters bilaterally. Patient has a mild to moderate bunion deformity noted bilaterally      Assessment:        Problem List Items Addressed This Visit       Acute dermatitis    Relevant Medications    lidocaine (XYLOCAINE) 4 % external solution    Other Relevant Orders    Initiate Outpatient Wound Care Protocol    Varicose veins of right lower extremity with inflammation, with ulcer of ankle limited to breakdown of skin ([facility]) - Primary    Relevant Medications    lidocaine (XYLOCAINE) 4 % external solution    Other Relevant Orders    Initiate Outpatient Wound Care Protocol           Plan:   E/M x30 minutes     Patient will be sent for a venous reflux study as she has extensive varicose veins on her bilateral lower extremities. As the ulceration previously improved significantly with multilayer compression dressings there is likely a component of venous insufficiency contributing to this chronic dermatitis/skin ulceration. Encouraged patient to keep the appointment for this study. A multilayer compression dressing was applied to the patient's right lower extremity. She was instructed to keep it clean dry and intact. Patient has significant venous disease. We will control her edema with multilayer compression dressing. Treatment Note please see attached Discharge Instructions    Written patient dismissal instructions given to patient and signed by patient or POA.          Reappoint 1 weeks for follow-up    Discharge [address] [place] [address] Physician Orders and Discharge Instructions  [place] [address]  Telephone: [phone]  [address] 8:00 am - 4:30 pm and Friday 8:00 am - 12:00 pm.          NAME:  Estella Jackson  YOB: 1970  MEDICAL RECORD NUMBER:  [id number]  DATE:  10/20/2022     Important Reminders:   Please wash hands with soap and water before and after every dressing change. Do not scrub wounds. Keep wounds dry in shower unless otherwise instructed by the physician. SMOKING can slow would healing. Stop smoking as soon as possible to improve healing and prevent further complications associated with smoking. Sarah-Wound Topical Treatments:  Do not apply lotions, creams, or ointments to wound bed unless directed. [] Apply moisturizing lotion to skin surrounding the wound prior to dressing change.  [] Apply antifungal ointment to skin surrounding the wound prior to dressing change.  [] Apply thin film of no sting moisture barrier ointment to skin immediately around      wound. [] Other:         Wound Location: RIGHT ANTERIOR ANKLE      Primary Dressing:  [x] BETAMETHASONE   []      Secondary Dressing:  [x] OPTILOCK  []         Dressing Frequency:  []   [x] Do Not Change Dressing           Compression and Edema Control: COBAN 2 TO RIGHT LEG  [] Wear Home Compression Stockings   [] Spandagrip to:    Size: []Low compression 5-10 mm/Hg                 []Medium compression 10-20 mm/Hg           []High compression  20-30 mm/Hg  [] Ace Wrap Toes to Knee to    [x] Multilayer Compression Wrap: Coban 2 to Right Leg              Do not get leg(s) with wrap wet. If wraps become too tight call the center or completely remove the wrap. Pressure Relief and Off Loading:  [] Off-loading when   [] walking       [] in bed         [] sitting   Turn every 2 hours when in bed             Avoid putting direct pressure on the site of the wound. Limit side lying to 30 degree tilt. Limit elevating the head of the bed greater than 30 degrees. [] Assistive Devices     Use as instructed by the provider        Activity: Activity as Tolerated        Dietary:   Continue your diet as tolerated. Protein is a key nutrient in helping to repair damaged tissue and promote new tissue growth. Good sources of protein include milk, yogurt, cheese, fish, lean meat and beans. If you are DIABETIC, having diabetes can make it hard for wounds to heal. Try to keep your blood sugar within it's target range. Limit Sodium, Alcohol and Sugar. Pain:   Please Note some pain, drainage and/or bleeding might be expected after seeing the provider. TO HELP ALLEVIATE PAIN WE RECOMMEND THE FOLLOWING  Elevate the affected limb. Use over the counter medications as permitted by your family doctor. For Persistent Pain not relieved by the above interventions, please notify your family doctor. Return Appointment:  [x] Return Appointment: With DR HOLLOWAY  in 1 Week(s)  [] Wound and dressing supply provider:   [] ECF or Home Healthcare:  [] Wound Assessment:         [] Physician or NP scheduled for Wound Assessment:   [x] Orders placed during your visit: VENOUS REFLUX STUDY TO RIGHT LEG - PLEASE CALL [phone] TO SCHEDULE (10/28/2022). : SUSY      Electronically signed by Nila Matos RN on 10/20/2022 at 9:38 AM          [facility] Information: Should you experience any significant changes in your wound(s) or have questions about your wound care, please contact the [address] [place] [address] at [address] 8:00 am - 4:30 pm and Friday 8:00 am - 12:30 pm.  If you need help with your wound outside these hours and cannot wait until we are again available, contact your PCP or go to the hospital emergency room. PLEASE NOTE: IF YOU ARE UNABLE TO OBTAIN WOUND SUPPLIES, CONTINUE TO USE THE SUPPLIES YOU HAVE AVAILABLE UNTIL YOU ARE ABLE TO REACH US. IT IS MOST IMPORTANT TO KEEP THE WOUND COVERED AT ALL TIMES. Physician Signature:_______________________     Date: ___________ Time:  ____________                                  Dr Dimas Bryant         Electronically signed by Dimas Bryant DPM on 10/20/2022 at 10:05 AM

## 2022-10-21 NOTE — DISCHARGE INSTRUCTIONS
[address]  Physician Orders   [facility] [place]  [address] [facility] [address], [place] [address]  Telephone: [phone]    NAME:  Hyacinth Flores OF BIRTH:  1970  MEDICAL RECORD NUMBER:  [id number]  DATE:  10/27/2022    Congratulations! You have completed your treatment. Return to your Primary Care Physician for all your health issues. Resume your ordinary activities as tolerated. Take your medications as prescribed by your primary care physician. Check your skin daily for cracks, bruises, sores, or dryness. Use a moisturizer as needed. Clean and dry your skin, using mild soap and warm water (not hot). Avoid alcohol and caffeine and do not smoke. Maintain a nutritious diet. Avoid pressure on your wound site. Keep your legs elevated above the level of the heart whenever possible. Continue to use wraps/stockings/compression as prescribed. Replace compression stockings every four to six months as needed to ensure proper fit.      **SCRIPT GIVEN FOR NEW COMPRESSION HOSE**    **HIGH SPANDAGRIP TO RIGHT LEG - WEAR UNTIL YOU GET YOUR COMPRESSION HOSE**    **VENOUS REFLUX STUDY 10/28/2022**      Physician Signature:______________________    Date: ___________ Time:  ____________    Dr Zaid Willis             Electronically signed by Frances Diaz RN on 10/27/2022 at 10:16 AM

## 2022-10-27 ENCOUNTER — HOSPITAL ENCOUNTER (OUTPATIENT)
Dept: WOUND CARE | Age: 52
Discharge: HOME | End: 2022-10-27
Payer: MEDICAID

## 2022-10-27 VITALS
DIASTOLIC BLOOD PRESSURE: 74 MMHG | TEMPERATURE: 97.2 F | HEART RATE: 82 BPM | SYSTOLIC BLOOD PRESSURE: 125 MMHG | RESPIRATION RATE: 18 BRPM

## 2022-10-27 DIAGNOSIS — L30.9 ACUTE DERMATITIS: Primary | ICD-10-CM

## 2022-10-27 DIAGNOSIS — L97.311 VARICOSE VEINS OF RIGHT LOWER EXTREMITY WITH INFLAMMATION, WITH ULCER OF ANKLE LIMITED TO BREAKDOWN OF SKIN (HCC): ICD-10-CM

## 2022-10-27 DIAGNOSIS — I83.213 VARICOSE VEINS OF RIGHT LOWER EXTREMITY WITH INFLAMMATION, WITH ULCER OF ANKLE LIMITED TO BREAKDOWN OF SKIN (HCC): ICD-10-CM

## 2022-10-27 PROCEDURE — 99212 OFFICE O/P EST SF 10 MIN: CPT

## 2022-10-27 ASSESSMENT — PAIN SCALES - GENERAL
PAINLEVEL_OUTOF10: 0
PAINLEVEL_OUTOF10: 0

## 2022-10-28 ENCOUNTER — PROCEDURE VISIT (OUTPATIENT)
Dept: SURGERY | Age: 52
End: 2022-10-28
Payer: MEDICAID

## 2022-10-28 DIAGNOSIS — L97.311 VARICOSE VEINS OF RIGHT LOWER EXTREMITY WITH INFLAMMATION, WITH ULCER OF ANKLE LIMITED TO BREAKDOWN OF SKIN (HCC): Primary | ICD-10-CM

## 2022-10-28 DIAGNOSIS — I83.213 VARICOSE VEINS OF RIGHT LOWER EXTREMITY WITH INFLAMMATION, WITH ULCER OF ANKLE LIMITED TO BREAKDOWN OF SKIN (HCC): Primary | ICD-10-CM

## 2022-10-28 PROCEDURE — 93971 EXTREMITY STUDY: CPT | Performed by: STUDENT IN AN ORGANIZED HEALTH CARE EDUCATION/TRAINING PROGRAM

## 2022-10-31 NOTE — PLAN OF CARE
Dr Medrano Early notified of venous reflux study results. Verbal orders received to have patient see Dr Edson Burgess. Pt notified and voices understanding.

## 2022-10-31 NOTE — PROGRESS NOTES
[address]   Progress Note and Procedure Note      [address] [facility] RECORD NUMBER:  [id number]  AGE: 50 y.o. GENDER: female  : 1970  EPISODE DATE:  10/27/2022    Subjective:     Chief Complaint   Patient presents with    Wound Check     Follow up Wound Right Leg             HISTORY of PRESENT ILLNESS HPI     Douglas Ferrera is a 50 y.o. female who presents today for wound/ulcer evaluation. History of Wound Context: Presents today for a \"sore\" on her right anterior dorsal foot and ankle. She relates it has been there for over 3 months. She has had treatment by her primary care physician as well as Dr. Francoise Leonardo. She has used both a topical steroid and emollients without any improvement in her symptoms. She states the rash itches. She denies any constitutional symptoms or warmth associated with the lesion. She denies any trauma to the area. She denies any history of a burn or a splash of chemicals to the area. Relates the ulceration started again in July. She states she has been unable to tolerate her compression stockings very well. Patient relates she has tolerated her multilayer compression dressing without any problems. Patient relates she is scheduled for her venous reflux study on . Wound/Ulcer Pain Timing/Severity: mild, patient relates it is more itchy than painful. She has tolerated her compression dressing well. Quality of pain: N/A  Severity:  0 / 10   Modifying Factors: None  Associated Signs/Symptoms: edema    Ulcer Identification:  Ulcer Type:  Dermatitis    Contributing Factors: edema and venous stasis    Acute Wound: N/A    PAST MEDICAL HISTORY        Diagnosis Date    Arthritis     Back pain     Headache        PAST SURGICAL HISTORY    Past Surgical History:   Procedure Laterality Date     SECTION         FAMILY HISTORY    History reviewed. No pertinent family history.     SOCIAL HISTORY    Social History Tobacco Use    Smoking status: Never    Smokeless tobacco: Never   Substance Use Topics    Alcohol use: No    Drug use: No       ALLERGIES    Allergies   Allergen Reactions    Amoxicillin     Penicillins        MEDICATIONS    Current Outpatient Medications on File Prior to Encounter   Medication Sig Dispense Refill    SUMAtriptan (IMITREX) 100 MG tablet Take 100 mg by mouth as needed      amitriptyline (ELAVIL) 25 MG tablet Take 25 mg by mouth nightly      cyclobenzaprine (FLEXERIL) 10 MG tablet Take 10 mg by mouth 3 times daily as needed for Muscle spasms      ketoconazole (NIZORAL) 2 % cream Apply topically bid to right foot lesion. 60 g 5    loratadine (CLARITIN) 10 MG tablet TAKE 1 TABLET EVERY DAY BY ORAL ROUTE AS NEEDED. naproxen (NAPROSYN) 500 MG tablet Take 1 tablet by mouth 2 times daily (with meals) (Patient not taking: Reported on 9/29/2022) 60 tablet 5     No current facility-administered medications on file prior to encounter. REVIEW OF SYSTEMS  Review of Systems    Pertinent items are noted in HPI. Review of Systems: A 12 point review of symptoms is unremarkable with the exception of the chief complaint. Patient specifically denies nausea, fever, vomiting, chills, shortness of breath, chest pain, abdominal pain, constipation or difficulty urinating. Objective:      /74   Pulse 82   Temp 97.2 °F (36.2 °C) (Temporal)   Resp 18     Wt Readings from Last 3 Encounters:   09/29/22 247 lb 12.8 oz (112.4 kg)   07/27/22 248 lb (112.5 kg)   06/23/22 244 lb 11.4 oz (111 kg)       PHYSICAL EXAM  Physical Exam    Patient has a palpable dorsalis pedis and posterior tibial pulse bilaterally. She has a normal hair growth pattern on her bilateral lower extremities. Capillary fill time is brisk to all digits. Patient has extensive varicose veins noted on her bilateral foot and legs. There is moderate nonpitting edema noted on the bilateral lower extremities.   There is a large plaque noted that is erythematous over the anterior lateral aspect of the right foot and ankle. There are dried crusty likely ruptured vesicles noted at the periphery of the plaque. There is no fluctuance or crepitus associated with this. The area is not warm to the touch. Patient's muscle strength is intact and symmetrical for dorsiflexors plantar flexors inverters and everters bilaterally. Patient has a mild to moderate bunion deformity noted bilaterally      Assessment:        Problem List Items Addressed This Visit       Acute dermatitis - Primary    Varicose veins of right lower extremity with inflammation, with ulcer of ankle limited to breakdown of skin (HCC)           Plan:   E/M x30 minutes     Patient will be sent for a venous reflux study as she has extensive varicose veins on her bilateral lower extremities. As the ulceration previously improved significantly with multilayer compression dressings there is likely a component of venous insufficiency contributing to this chronic dermatitis/skin ulceration. Encouraged patient to keep the appointment for this study. A multilayer compression dressing was applied to the patient's right lower extremity. She was instructed to keep it clean dry and intact. Patient has significant venous disease. We will control her edema with spandigrip until after her reflux study    Treatment Note please see attached Discharge Instructions    Written patient dismissal instructions given to patient and signed by patient or POA. Reappoint 1 weeks for follow-up    Discharge Instructions           [address] [facility] AT [address] [place] [address]  Telephone: [phone]    NAME:  Gurjit Bansal OF BIRTH:  1970  MEDICAL RECORD NUMBER:  [id number]  DATE:  10/27/2022    Congratulations! You have completed your treatment.        Return to your Primary Care Physician for all your health issues. Resume your ordinary activities as tolerated. Take your medications as prescribed by your primary care physician. Check your skin daily for cracks, bruises, sores, or dryness. Use a moisturizer as needed. Clean and dry your skin, using mild soap and warm water (not hot). Avoid alcohol and caffeine and do not smoke. Maintain a nutritious diet. Avoid pressure on your wound site. Keep your legs elevated above the level of the heart whenever possible. Continue to use wraps/stockings/compression as prescribed. Replace compression stockings every four to six months as needed to ensure proper fit.      **SCRIPT GIVEN FOR NEW COMPRESSION HOSE**    **HIGH SPANDAGRIP TO RIGHT LEG - WEAR UNTIL YOU GET YOUR COMPRESSION HOSE**    **VENOUS REFLUX STUDY 10/28/2022**      Physician Signature:______________________    Date: ___________ Time:  ____________    Dr Arnoldo [place]             Electronically signed by Jamas Lennox, RN on 10/27/2022 at 10:16 AM                           Electronically signed by Arnoldo [place] DPM on 10/31/2022 at 9:52 AM

## 2022-11-07 ENCOUNTER — INITIAL CONSULT (OUTPATIENT)
Dept: VASCULAR SURGERY | Age: 52
End: 2022-11-07
Payer: MEDICAID

## 2022-11-07 VITALS
HEIGHT: 61 IN | SYSTOLIC BLOOD PRESSURE: 124 MMHG | DIASTOLIC BLOOD PRESSURE: 76 MMHG | WEIGHT: 254 LBS | BODY MASS INDEX: 47.95 KG/M2

## 2022-11-07 DIAGNOSIS — L97.311 VARICOSE VEINS OF RIGHT LOWER EXTREMITY WITH INFLAMMATION, WITH ULCER OF ANKLE LIMITED TO BREAKDOWN OF SKIN (HCC): Primary | ICD-10-CM

## 2022-11-07 DIAGNOSIS — I83.213 VARICOSE VEINS OF RIGHT LOWER EXTREMITY WITH INFLAMMATION, WITH ULCER OF ANKLE LIMITED TO BREAKDOWN OF SKIN (HCC): Primary | ICD-10-CM

## 2022-11-07 PROCEDURE — 99214 OFFICE O/P EST MOD 30 MIN: CPT | Performed by: STUDENT IN AN ORGANIZED HEALTH CARE EDUCATION/TRAINING PROGRAM

## 2022-11-07 PROCEDURE — 3017F COLORECTAL CA SCREEN DOC REV: CPT | Performed by: STUDENT IN AN ORGANIZED HEALTH CARE EDUCATION/TRAINING PROGRAM

## 2022-11-07 PROCEDURE — G8427 DOCREV CUR MEDS BY ELIG CLIN: HCPCS | Performed by: STUDENT IN AN ORGANIZED HEALTH CARE EDUCATION/TRAINING PROGRAM

## 2022-11-07 PROCEDURE — 1036F TOBACCO NON-USER: CPT | Performed by: STUDENT IN AN ORGANIZED HEALTH CARE EDUCATION/TRAINING PROGRAM

## 2022-11-07 PROCEDURE — G8417 CALC BMI ABV UP PARAM F/U: HCPCS | Performed by: STUDENT IN AN ORGANIZED HEALTH CARE EDUCATION/TRAINING PROGRAM

## 2022-11-07 PROCEDURE — G8484 FLU IMMUNIZE NO ADMIN: HCPCS | Performed by: STUDENT IN AN ORGANIZED HEALTH CARE EDUCATION/TRAINING PROGRAM

## 2022-11-07 NOTE — Clinical Note
Thank you very much for referring this patient. Please see my note for the assessment and plan.   Sincerely,  Victorino Ware DO, ARVIND, FSDEDE, RPMARIA ANTONIA

## 2022-11-07 NOTE — PROGRESS NOTES
Linda Sanders (:  1970) is a 50 y.o. female,Established patient, here for evaluation of the following chief complaint(s):  Varicose Veins and Follow-up         ASSESSMENT/PLAN:  1. Varicose veins of right lower extremity with inflammation, with ulcer of ankle limited to breakdown of skin [facility])    This is a 50year old female patient who presents with persistent nonhealing wound of her right lower extremity. Given the failure to heal with conservative measures and presence of clinically significant axial venous reflux will proceed with right greater saphenous vein ablation and phlebectomies to hopefully help redirect venous drainage of her leg and improve chances of wound healing. Risks/benefits/alternatives clearly reviewed with patient including: pain, infection, bleeding, thrombosis, neuralgia. She understands and is willing to proceed. All questions answered. Subjective   SUBJECTIVE/OBJECTIVE:  This is a 50year old female patient who presents to the office today to discuss ongoing trouble with a right dorsal ankle wound with associated inflammation. She has recently undergone reflux testing demonstrating superficial and deep venous reflux. The patient was las seen 6 months ago and has experienced some improvement but incomplete resolution. She follows in the wound center and is currently undergoing a compression regimen. She denies fever or chills. She denies chest pain or shortness of breath. Objective   Physical Exam  Constitutional:       Appearance: She is obese. Cardiovascular:      Rate and Rhythm: Normal rate and regular rhythm. Pulmonary:      Effort: Pulmonary effort is normal. No respiratory distress. Musculoskeletal:      Right lower leg: Edema present. Left lower leg: Edema present. Skin:     General: Skin is warm and dry. Capillary Refill: Capillary refill takes less than 2 seconds.       Comments: Right dorsal ankle and foot redness with scaling and cracking skin, tender to touch  Large prominent clusters of varicose veins bilaterally over both upper and lower legs   Neurological:      Mental Status: She is alert.           Triston Valencia DO, FACS, FSVS, [address] [facility] V[facility] and Endovascular Surgery

## 2022-11-21 ENCOUNTER — TELEPHONE (OUTPATIENT)
Dept: SURGERY | Age: 52
End: 2022-11-21

## 2022-11-21 NOTE — TELEPHONE ENCOUNTER
[facility] [place] [facility] in addition to clinical notes  Can be faxed at [phone]

## 2022-12-13 ENCOUNTER — TELEPHONE (OUTPATIENT)
Dept: SURGERY | Age: 52
End: 2022-12-13

## 2022-12-20 ENCOUNTER — HOSPITAL ENCOUNTER (OUTPATIENT)
Dept: WOMENS IMAGING | Age: 52
Discharge: HOME | End: 2022-12-20
Payer: MEDICAID

## 2022-12-20 DIAGNOSIS — Z12.31 BREAST CANCER SCREENING BY MAMMOGRAM: ICD-10-CM

## 2022-12-20 PROCEDURE — 77067 SCR MAMMO BI INCL CAD: CPT

## 2022-12-21 ENCOUNTER — TELEPHONE (OUTPATIENT)
Dept: VASCULAR SURGERY | Age: 52
End: 2022-12-21

## 2022-12-21 NOTE — TELEPHONE ENCOUNTER
Shona would like to know if you have heard from her insurance about her varicose veins yet.     Please call her at [phone]

## 2022-12-27 NOTE — TELEPHONE ENCOUNTER
Pt informed on web site, still listed as pended. PA request form completed and will try to fax to see if we can get a response.

## 2023-01-03 DIAGNOSIS — L97.311 VARICOSE VEINS OF RIGHT LOWER EXTREMITY WITH INFLAMMATION, WITH ULCER OF ANKLE LIMITED TO BREAKDOWN OF SKIN (HCC): Primary | ICD-10-CM

## 2023-01-03 DIAGNOSIS — I83.213 VARICOSE VEINS OF RIGHT LOWER EXTREMITY WITH INFLAMMATION, WITH ULCER OF ANKLE LIMITED TO BREAKDOWN OF SKIN (HCC): Primary | ICD-10-CM

## 2023-01-03 NOTE — TELEPHONE ENCOUNTER
Surgery scheduled for 1-13-23.  0600 arrival for surgery at 0730. NPO after midnight. Must have a .   Vascular scan on 1-17-23 2 pm in vascular lab @[place].

## 2023-01-04 NOTE — PROGRESS NOTES
[address]   74    On  @ [facility] [phone]   For JUANITO  Pt will call us back with med reconciliation.

## 2023-01-09 RX ORDER — CHOLECALCIFEROL (VITAMIN D3) 125 MCG
500 CAPSULE ORAL DAILY
COMMUNITY

## 2023-01-09 NOTE — PROGRESS NOTES
C-diff Questionnaire:     * Admitted with diarrhea? [] YES    [x]  NO     *Prior history of C-Diff. In last 3 months? [] YES    [x]  NO     *Antibiotic use in the past 6-8 weeks? [x]  NO    []  YES      If yes, which: REASON_________________     *Prior hospitalization or nursing home in the last month? []  YES    [x]  NO     SAFETY FIRST. .call before you fall    [address] time____6        Surgery time_______730_____    Do not eat or drink anything after 12:00 midnight prior to your surgery. This includes water chewing gum, mints and ice chips- the Day of Surgery. You may brush your teeth and gargle the morning of your surgery, but do not swallow the water     Please see your family doctor/pediatrician for a history and physical and/or questions concerning medications. Bring any test results/reports from your physicians office. If you are under the care of a heart doctor or specialist doctor, please be aware that you may be asked to them for clearance    You may be asked to stop blood thinners such as Coumadin, Plavix, Fragmin, Lovenox, etc., or any anti-inflammatories such as:  Aspirin, Ibuprofen, Advil, Naproxen prior to your surgery. We also ask that you stop any OTC medications such as fish oil, vitamin E, glucosamine, garlic, Multivitamins, COQ 10, etc.    We ask that you do not smoke 24 hours prior to surgery  We ask that you do not  drink any alcoholic beverages 24 hours prior to surgery     You must make arrangements for a responsible adult to take you home after your surgery. For your safety you will not be allowed to leave alone or drive yourself home. Your surgery will be cancelled if you do not have a ride home. Also for your safety, it is strongly suggested that someone stay with you the first 24 hours after your surgery.      A parent or legal guardian must accompany a child scheduled for surgery and plan to stay at the hospital until the child is discharged. Please do not bring other children with you. For your comfort, please wear simple loose fitting clothing to the hospital.  Please do not bring valuables. Do not wear any make-up or nail polish on your fingers or toes. For your safety, please do not wear any jewelry or body piercing's on the day of surgery. All jewelry must be removed. If you have dentures, they will be removed before going to operating room. For your convenience, we will provide you with a container. If you wear contact lenses or glasses, they will be removed, please bring a case for them. If you have a living will and a durable power of  for healthcare, please bring in a copy. As part of our patient safety program to minimize surgical site infections, we ask you to do the following:    Please notify your surgeon if you develop any illness between         now and the day of your surgery. This includes a cough, cold, fever, sore throat, nausea,         or vomiting, and diarrhea, etc.   Please notify your surgeon if you experience dizziness, shortness         of breath or blurred vision between now and the time of your surgery. Do not shave your operative site 96 hours prior to surgery. For face and neck surgery, men may use an electric razor 48 hours   prior to surgery. You may shower the night before surgery or the morning of   your surgery with an antibacterial soap. You will need to bring a photo ID and insurance card     If you use a C-pap or Bi-pap machine, please bring your machine with you to the hospital     Our goal is to provide you with excellent care, therefore, visitors will be limited to so that we may focus on providing this care for you. Please contact your surgeon office, if you have any further questions.                  [facility] phone number:  [phone] PAT fax number:  [phone]    Please note these are generalized instructions for all surgical cases, you may be provided with more specific instructions according to your surgery.

## 2023-01-10 NOTE — FLOWSHEET NOTE
DOS 23    74    RE: pre-op H&P. At time of PAT call, pt. [place] that she was going to [facility]. No copy of this in Media or letters. Message left for pt. To call the PAT office re: this. Please make sure she went to this appt. And that she has MEDICAL clearance    UPDATE: Called [phone] spoke with Donella Cheadle , she will fax H&P to PAT. AWAIT FAX. Called again at [facility] will fax.     Update: 23: pre-op h&p in [facility] and patient medically cleared

## 2023-01-12 ENCOUNTER — ANESTHESIA EVENT (OUTPATIENT)
Dept: OPERATING ROOM | Age: 53
End: 2023-01-12
Payer: MEDICAID

## 2023-01-13 ENCOUNTER — ANESTHESIA (OUTPATIENT)
Dept: OPERATING ROOM | Age: 53
End: 2023-01-13
Payer: MEDICAID

## 2023-01-13 ENCOUNTER — HOSPITAL ENCOUNTER (OUTPATIENT)
Age: 53
Setting detail: OUTPATIENT SURGERY
Discharge: HOME | End: 2023-01-13
Attending: STUDENT IN AN ORGANIZED HEALTH CARE EDUCATION/TRAINING PROGRAM | Admitting: STUDENT IN AN ORGANIZED HEALTH CARE EDUCATION/TRAINING PROGRAM
Payer: MEDICAID

## 2023-01-13 VITALS
BODY MASS INDEX: 48.15 KG/M2 | WEIGHT: 255 LBS | OXYGEN SATURATION: 99 % | RESPIRATION RATE: 18 BRPM | DIASTOLIC BLOOD PRESSURE: 74 MMHG | HEIGHT: 61 IN | TEMPERATURE: 97.1 F | SYSTOLIC BLOOD PRESSURE: 139 MMHG | HEART RATE: 64 BPM

## 2023-01-13 DIAGNOSIS — G89.18 POST-OP PAIN: Primary | ICD-10-CM

## 2023-01-13 LAB
ANION GAP SERPL CALCULATED.3IONS-SCNC: 10 MMOL/L (ref 3–16)
BUN BLDV-MCNC: 12 MG/DL (ref 7–20)
CALCIUM SERPL-MCNC: 9.4 MG/DL (ref 8.3–10.6)
CHLORIDE BLD-SCNC: 102 MMOL/L (ref 99–110)
CO2: 27 MMOL/L (ref 21–32)
CREAT SERPL-MCNC: 0.8 MG/DL (ref 0.6–1.1)
GFR SERPL CREATININE-BSD FRML MDRD: >60 ML/MIN/{1.73_M2}
GLUCOSE BLD-MCNC: 96 MG/DL (ref 70–99)
HCT VFR BLD CALC: 40.5 % (ref 36–48)
HEMOGLOBIN: 13.4 G/DL (ref 12–16)
MCH RBC QN AUTO: 29.9 PG (ref 26–34)
MCHC RBC AUTO-ENTMCNC: 33.1 G/DL (ref 31–36)
MCV RBC AUTO: 90.2 FL (ref 80–100)
PDW BLD-RTO: 15.1 % (ref 12.4–15.4)
PLATELET # BLD: 198 K/UL (ref 135–450)
PMV BLD AUTO: 9.6 FL (ref 5–10.5)
POTASSIUM SERPL-SCNC: 4 MMOL/L (ref 3.5–5.1)
RBC # BLD: 4.49 M/UL (ref 4–5.2)
SODIUM BLD-SCNC: 139 MMOL/L (ref 136–145)
WBC # BLD: 5.8 K/UL (ref 4–11)

## 2023-01-13 PROCEDURE — C1769 GUIDE WIRE: HCPCS | Performed by: STUDENT IN AN ORGANIZED HEALTH CARE EDUCATION/TRAINING PROGRAM

## 2023-01-13 PROCEDURE — 7100000000 HC PACU RECOVERY - FIRST 15 MIN: Performed by: STUDENT IN AN ORGANIZED HEALTH CARE EDUCATION/TRAINING PROGRAM

## 2023-01-13 PROCEDURE — 7100000001 HC PACU RECOVERY - ADDTL 15 MIN: Performed by: STUDENT IN AN ORGANIZED HEALTH CARE EDUCATION/TRAINING PROGRAM

## 2023-01-13 PROCEDURE — 3700000000 HC ANESTHESIA ATTENDED CARE: Performed by: STUDENT IN AN ORGANIZED HEALTH CARE EDUCATION/TRAINING PROGRAM

## 2023-01-13 PROCEDURE — 2580000003 HC RX 258: Performed by: ANESTHESIOLOGY

## 2023-01-13 PROCEDURE — 6360000002 HC RX W HCPCS

## 2023-01-13 PROCEDURE — 80048 BASIC METABOLIC PNL TOTAL CA: CPT

## 2023-01-13 PROCEDURE — 2500000003 HC RX 250 WO HCPCS

## 2023-01-13 PROCEDURE — 3600000014 HC SURGERY LEVEL 4 ADDTL 15MIN: Performed by: STUDENT IN AN ORGANIZED HEALTH CARE EDUCATION/TRAINING PROGRAM

## 2023-01-13 PROCEDURE — 2500000003 HC RX 250 WO HCPCS: Performed by: STUDENT IN AN ORGANIZED HEALTH CARE EDUCATION/TRAINING PROGRAM

## 2023-01-13 PROCEDURE — 6360000002 HC RX W HCPCS: Performed by: STUDENT IN AN ORGANIZED HEALTH CARE EDUCATION/TRAINING PROGRAM

## 2023-01-13 PROCEDURE — 3600000004 HC SURGERY LEVEL 4 BASE: Performed by: STUDENT IN AN ORGANIZED HEALTH CARE EDUCATION/TRAINING PROGRAM

## 2023-01-13 PROCEDURE — 85027 COMPLETE CBC AUTOMATED: CPT

## 2023-01-13 PROCEDURE — A4217 STERILE WATER/SALINE, 500 ML: HCPCS | Performed by: STUDENT IN AN ORGANIZED HEALTH CARE EDUCATION/TRAINING PROGRAM

## 2023-01-13 PROCEDURE — 7100000011 HC PHASE II RECOVERY - ADDTL 15 MIN: Performed by: STUDENT IN AN ORGANIZED HEALTH CARE EDUCATION/TRAINING PROGRAM

## 2023-01-13 PROCEDURE — C1888 ENDOVAS NON-CARDIAC ABL CATH: HCPCS | Performed by: STUDENT IN AN ORGANIZED HEALTH CARE EDUCATION/TRAINING PROGRAM

## 2023-01-13 PROCEDURE — 3700000001 HC ADD 15 MINUTES (ANESTHESIA): Performed by: STUDENT IN AN ORGANIZED HEALTH CARE EDUCATION/TRAINING PROGRAM

## 2023-01-13 PROCEDURE — 7100000010 HC PHASE II RECOVERY - FIRST 15 MIN: Performed by: STUDENT IN AN ORGANIZED HEALTH CARE EDUCATION/TRAINING PROGRAM

## 2023-01-13 PROCEDURE — 2580000003 HC RX 258: Performed by: STUDENT IN AN ORGANIZED HEALTH CARE EDUCATION/TRAINING PROGRAM

## 2023-01-13 PROCEDURE — 2709999900 HC NON-CHARGEABLE SUPPLY: Performed by: STUDENT IN AN ORGANIZED HEALTH CARE EDUCATION/TRAINING PROGRAM

## 2023-01-13 PROCEDURE — C1894 INTRO/SHEATH, NON-LASER: HCPCS | Performed by: STUDENT IN AN ORGANIZED HEALTH CARE EDUCATION/TRAINING PROGRAM

## 2023-01-13 RX ORDER — FENTANYL CITRATE 50 UG/ML
50 INJECTION, SOLUTION INTRAMUSCULAR; INTRAVENOUS EVERY 5 MIN PRN
Status: DISCONTINUED | OUTPATIENT
Start: 2023-01-13 | End: 2023-01-13 | Stop reason: HOSPADM

## 2023-01-13 RX ORDER — SODIUM CHLORIDE 9 MG/ML
INJECTION, SOLUTION INTRAVENOUS PRN
Status: DISCONTINUED | OUTPATIENT
Start: 2023-01-13 | End: 2023-01-13 | Stop reason: SDUPTHER

## 2023-01-13 RX ORDER — PROPOFOL 10 MG/ML
INJECTION, EMULSION INTRAVENOUS PRN
Status: DISCONTINUED | OUTPATIENT
Start: 2023-01-13 | End: 2023-01-13 | Stop reason: SDUPTHER

## 2023-01-13 RX ORDER — LIDOCAINE HYDROCHLORIDE 20 MG/ML
INJECTION, SOLUTION EPIDURAL; INFILTRATION; INTRACAUDAL; PERINEURAL PRN
Status: DISCONTINUED | OUTPATIENT
Start: 2023-01-13 | End: 2023-01-13 | Stop reason: SDUPTHER

## 2023-01-13 RX ORDER — SODIUM CHLORIDE 0.9 % (FLUSH) 0.9 %
5-40 SYRINGE (ML) INJECTION PRN
Status: DISCONTINUED | OUTPATIENT
Start: 2023-01-13 | End: 2023-01-13 | Stop reason: SDUPTHER

## 2023-01-13 RX ORDER — PHENYLEPHRINE HCL IN 0.9% NACL 1 MG/10 ML
SYRINGE (ML) INTRAVENOUS PRN
Status: DISCONTINUED | OUTPATIENT
Start: 2023-01-13 | End: 2023-01-13 | Stop reason: SDUPTHER

## 2023-01-13 RX ORDER — FENTANYL CITRATE 50 UG/ML
INJECTION, SOLUTION INTRAMUSCULAR; INTRAVENOUS PRN
Status: DISCONTINUED | OUTPATIENT
Start: 2023-01-13 | End: 2023-01-13 | Stop reason: SDUPTHER

## 2023-01-13 RX ORDER — SODIUM CHLORIDE 0.9 % (FLUSH) 0.9 %
5-40 SYRINGE (ML) INJECTION EVERY 12 HOURS SCHEDULED
Status: DISCONTINUED | OUTPATIENT
Start: 2023-01-13 | End: 2023-01-13 | Stop reason: SDUPTHER

## 2023-01-13 RX ORDER — SODIUM CHLORIDE 9 MG/ML
INJECTION, SOLUTION INTRAVENOUS PRN
Status: DISCONTINUED | OUTPATIENT
Start: 2023-01-13 | End: 2023-01-13 | Stop reason: HOSPADM

## 2023-01-13 RX ORDER — SODIUM CHLORIDE 0.9 % (FLUSH) 0.9 %
5-40 SYRINGE (ML) INJECTION PRN
Status: DISCONTINUED | OUTPATIENT
Start: 2023-01-13 | End: 2023-01-13 | Stop reason: HOSPADM

## 2023-01-13 RX ORDER — MEPERIDINE HYDROCHLORIDE 25 MG/ML
12.5 INJECTION INTRAMUSCULAR; INTRAVENOUS; SUBCUTANEOUS
Status: DISCONTINUED | OUTPATIENT
Start: 2023-01-13 | End: 2023-01-13 | Stop reason: HOSPADM

## 2023-01-13 RX ORDER — FENTANYL CITRATE 50 UG/ML
25 INJECTION, SOLUTION INTRAMUSCULAR; INTRAVENOUS EVERY 5 MIN PRN
Status: DISCONTINUED | OUTPATIENT
Start: 2023-01-13 | End: 2023-01-13 | Stop reason: HOSPADM

## 2023-01-13 RX ORDER — ONDANSETRON 2 MG/ML
4 INJECTION INTRAMUSCULAR; INTRAVENOUS
Status: DISCONTINUED | OUTPATIENT
Start: 2023-01-13 | End: 2023-01-13 | Stop reason: HOSPADM

## 2023-01-13 RX ORDER — FAMOTIDINE 10 MG/ML
INJECTION, SOLUTION INTRAVENOUS PRN
Status: DISCONTINUED | OUTPATIENT
Start: 2023-01-13 | End: 2023-01-13 | Stop reason: SDUPTHER

## 2023-01-13 RX ORDER — DEXAMETHASONE SODIUM PHOSPHATE 4 MG/ML
INJECTION, SOLUTION INTRA-ARTICULAR; INTRALESIONAL; INTRAMUSCULAR; INTRAVENOUS; SOFT TISSUE PRN
Status: DISCONTINUED | OUTPATIENT
Start: 2023-01-13 | End: 2023-01-13 | Stop reason: SDUPTHER

## 2023-01-13 RX ORDER — SODIUM CHLORIDE 0.9 % (FLUSH) 0.9 %
5-40 SYRINGE (ML) INJECTION EVERY 12 HOURS SCHEDULED
Status: DISCONTINUED | OUTPATIENT
Start: 2023-01-13 | End: 2023-01-13 | Stop reason: HOSPADM

## 2023-01-13 RX ORDER — MIDAZOLAM HYDROCHLORIDE 1 MG/ML
INJECTION INTRAMUSCULAR; INTRAVENOUS PRN
Status: DISCONTINUED | OUTPATIENT
Start: 2023-01-13 | End: 2023-01-13 | Stop reason: SDUPTHER

## 2023-01-13 RX ORDER — ROCURONIUM BROMIDE 10 MG/ML
INJECTION, SOLUTION INTRAVENOUS PRN
Status: DISCONTINUED | OUTPATIENT
Start: 2023-01-13 | End: 2023-01-13 | Stop reason: SDUPTHER

## 2023-01-13 RX ORDER — MAGNESIUM HYDROXIDE 1200 MG/15ML
LIQUID ORAL CONTINUOUS PRN
Status: COMPLETED | OUTPATIENT
Start: 2023-01-13 | End: 2023-01-13

## 2023-01-13 RX ORDER — ONDANSETRON 2 MG/ML
INJECTION INTRAMUSCULAR; INTRAVENOUS PRN
Status: DISCONTINUED | OUTPATIENT
Start: 2023-01-13 | End: 2023-01-13 | Stop reason: SDUPTHER

## 2023-01-13 RX ORDER — OXYCODONE HYDROCHLORIDE 5 MG/1
5 TABLET ORAL EVERY 6 HOURS PRN
Qty: 12 TABLET | Refills: 0 | Status: SHIPPED | OUTPATIENT
Start: 2023-01-13 | End: 2023-01-16

## 2023-01-13 RX ADMIN — Medication 1500 MG: at 06:53

## 2023-01-13 RX ADMIN — FENTANYL CITRATE 50 MCG: 50 INJECTION INTRAMUSCULAR; INTRAVENOUS at 07:33

## 2023-01-13 RX ADMIN — Medication 1500 MG: at 08:17

## 2023-01-13 RX ADMIN — Medication 100 MCG: at 08:17

## 2023-01-13 RX ADMIN — SODIUM CHLORIDE: 9 INJECTION, SOLUTION INTRAVENOUS at 06:51

## 2023-01-13 RX ADMIN — MIDAZOLAM 2 MG: 1 INJECTION INTRAMUSCULAR; INTRAVENOUS at 07:28

## 2023-01-13 RX ADMIN — Medication 50 MCG: at 07:58

## 2023-01-13 RX ADMIN — ONDANSETRON 4 MG: 2 INJECTION INTRAMUSCULAR; INTRAVENOUS at 08:54

## 2023-01-13 RX ADMIN — SUGAMMADEX 100 MG: 100 INJECTION, SOLUTION INTRAVENOUS at 08:54

## 2023-01-13 RX ADMIN — SUGAMMADEX 300 MG: 100 INJECTION, SOLUTION INTRAVENOUS at 08:50

## 2023-01-13 RX ADMIN — FENTANYL CITRATE 12.5 MCG: 50 INJECTION INTRAMUSCULAR; INTRAVENOUS at 08:39

## 2023-01-13 RX ADMIN — Medication 50 MCG: at 08:13

## 2023-01-13 RX ADMIN — FAMOTIDINE 20 MG: 10 INJECTION, SOLUTION INTRAVENOUS at 08:19

## 2023-01-13 RX ADMIN — DEXAMETHASONE SODIUM PHOSPHATE 10 MG: 4 INJECTION, SOLUTION INTRAMUSCULAR; INTRAVENOUS at 07:43

## 2023-01-13 RX ADMIN — LIDOCAINE HYDROCHLORIDE 100 MG: 20 INJECTION, SOLUTION EPIDURAL; INFILTRATION; INTRACAUDAL; PERINEURAL at 07:33

## 2023-01-13 RX ADMIN — Medication 50 MCG: at 08:01

## 2023-01-13 RX ADMIN — PROPOFOL 200 MG: 10 INJECTION, EMULSION INTRAVENOUS at 07:33

## 2023-01-13 RX ADMIN — ROCURONIUM BROMIDE 50 MG: 10 INJECTION INTRAVENOUS at 07:34

## 2023-01-13 RX ADMIN — FENTANYL CITRATE 12.5 MCG: 50 INJECTION INTRAMUSCULAR; INTRAVENOUS at 08:34

## 2023-01-13 ASSESSMENT — LIFESTYLE VARIABLES: SMOKING_STATUS: 0

## 2023-01-13 ASSESSMENT — PAIN SCALES - GENERAL: PAINLEVEL_OUTOF10: 0

## 2023-01-13 ASSESSMENT — PAIN - FUNCTIONAL ASSESSMENT: PAIN_FUNCTIONAL_ASSESSMENT: NONE - DENIES PAIN

## 2023-01-13 NOTE — ANESTHESIA PRE PROCEDURE
[facility] Department of Anesthesiology  Pre-Anesthesia Evaluation/Consultation       Name:  Marlys Galicia  : 1970  Age:  50 y.o. MRN:  [id number]  Date: 2023           Surgeon: Surgeon(s):  Vanessa Whitfield DO    Procedure: Procedure(s):  RADIOFREQUENCY ABLATION OF GREATER SAPHENOUS VEIN OF RIGHT LOWER EXTREMITY WITH STAB PHLEBECTOMIES     Allergies   Allergen Reactions    Amoxicillin Hives    Penicillins Hives     Patient Active Problem List   Diagnosis    Acute dermatitis    Varicose veins of right lower extremity with inflammation, with ulcer of ankle limited to breakdown of skin ([address]     Past Medical History:   Diagnosis Date    Arthritis     Back pain     Headache     Migraine      Past Surgical History:   Procedure Laterality Date     SECTION      ESOPHAGOGASTRODUODENOSCOPY       Social History     Tobacco Use    Smoking status: Never    Smokeless tobacco: Never   Vaping Use    Vaping Use: Never used   Substance Use Topics    Alcohol use: No    Drug use: No     Medications  No current facility-administered medications on file prior to encounter. Current Outpatient Medications on File Prior to Encounter   Medication Sig Dispense Refill    vitamin B-12 (CYANOCOBALAMIN) 500 MCG tablet Take 500 mcg by mouth daily      SUMAtriptan (IMITREX) 100 MG tablet Take 100 mg by mouth as needed      amitriptyline (ELAVIL) 25 MG tablet Take 25 mg by mouth nightly      cyclobenzaprine (FLEXERIL) 10 MG tablet Take 10 mg by mouth 3 times daily as needed for Muscle spasms      loratadine (CLARITIN) 10 MG tablet TAKE 1 TABLET EVERY DAY BY ORAL ROUTE AS NEEDED.        Current Facility-Administered Medications   Medication Dose Route Frequency Provider Last Rate Last Admin    vancomycin (VANCOCIN) 1500 mg in dextrose 5 % 250 mL IVPB  1,500 mg IntraVENous On Call to [address] [place] [address]        sodium chloride flush 0.9 % injection 5-40 mL 5-40 mL IntraVENous 2 times per day Ekta Fitzgerald MD        sodium chloride flush 0.9 % injection 5-40 mL  5-40 mL IntraVENous PRSTEVEN Fitzgerald MD        0.9 % sodium chloride infusion   IntraVENous PRSTEVEN Fitzgerald MD         Vital Signs (Current)   Vitals:    01/04/23 1114 01/13/23 0616   Weight: 255 lb (115.7 kg) 255 lb (115.7 kg)   Height: 5' 1\" (1.549 m)                                             Vital Signs Statistics (for past 48 hrs)     No data recorded  BP Readings from Last 3 Encounters:   11/07/22 124/76   10/27/22 125/74   10/20/22 121/78       BMI  Body mass index is 48.18 kg/m². Estimated body mass index is 48.18 kg/m² as calculated from the following:    Height as of this encounter: 5' 1\" (1.549 m). Weight as of this encounter: 255 lb (115.7 kg).     CBC   Lab Results   Component Value Date/Time    WBC 7.1 08/26/2012 06:57 PM    RBC 4.70 08/26/2012 06:57 PM    HGB 14.9 08/26/2012 06:57 PM    HCT 43.5 08/26/2012 06:57 PM    MCV 92.5 08/26/2012 06:57 PM    RDW 14.4 08/26/2012 06:57 PM     08/26/2012 06:57 PM     CMP    Lab Results   Component Value Date/Time     08/26/2012 06:57 PM    K 3.8 08/26/2012 06:57 PM     08/26/2012 06:57 PM    CO2 28 08/26/2012 06:57 PM    BUN 9 08/26/2012 06:57 PM    CREATININE 0.8 08/26/2012 06:57 PM    GFRAA >60 08/26/2012 06:57 PM    AGRATIO 1.2 08/26/2012 06:57 PM    GLUCOSE 84 08/26/2012 06:57 PM    PROT 7.8 08/26/2012 06:57 PM    CALCIUM 9.7 08/26/2012 06:57 PM    BILITOT 0.60 08/26/2012 06:57 PM    ALKPHOS 71 08/26/2012 06:57 PM    AST 18 08/26/2012 06:57 PM    ALT 15 08/26/2012 06:57 PM     BMP    Lab Results   Component Value Date/Time     08/26/2012 06:57 PM    K 3.8 08/26/2012 06:57 PM     08/26/2012 06:57 PM    CO2 28 08/26/2012 06:57 PM    BUN 9 08/26/2012 06:57 PM    CREATININE 0.8 08/26/2012 06:57 PM    CALCIUM 9.7 08/26/2012 06:57 PM    GFRAA >60 08/26/2012 06:57 PM    GLUCOSE 84 08/26/2012 06:57 PM     POCGlucose  No results for input(s): GLUCOSE in the last 72 hours. Coags    Lab Results   Component Value Date/Time    PROTIME 12.6 08/26/2012 06:57 PM    INR 1.11 08/26/2012 06:57 PM    APTT 35.1 08/26/2012 06:57 PM     HCG (If Applicable)   Lab Results   Component Value Date    PREGTESTUR Neg 01/20/2011      ABGs No results found for: PHART, PO2ART, CIY1DPF, [address], BEART, Y2MJSFFM   Type & Screen (If Applicable)  No results found for: LABABO, LABRH                         BMI: Wt Readings from Last 3 Encounters:       NPO Status:   Date of last liquid consumption: 01/12/23   Time of last liquid consumption: 2100   Date of last solid food consumption: 01/12/23      Time of last solid consumption: 2100       Anesthesia Evaluation  Patient summary reviewed no history of anesthetic complications:   Airway: Mallampati: II  TM distance: >3 FB   Neck ROM: full  Mouth opening: > = 3 FB   Dental: normal exam         Pulmonary: breath sounds clear to auscultation      (-) COPD, asthma, sleep apnea and not a current smoker                           Cardiovascular:  Exercise tolerance: good (>4 METS),       (-) hypertension, past MI, CABG/stent and no hyperlipidemia        Rate: normal                    Neuro/Psych:   (+) headaches: migraine headaches,    (-) seizures, TIA and CVA           GI/Hepatic/Renal:   (+) morbid obesity     (-) GERD       Endo/Other:        (-) diabetes mellitus, hypothyroidism               Abdominal:             Vascular:     - DVT and PE. Other Findings:           Anesthesia Plan      general     ASA 3       Induction: intravenous. MIPS: Postoperative opioids intended and Prophylactic antiemetics administered. Anesthetic plan and risks discussed with patient. Plan discussed with CRNA. This pre-anesthesia assessment may be used as a history and physical.    DOS STAFF ADDENDUM:    Pt seen and examined, chart reviewed (including anesthesia, drug and allergy history).   No interval changes to history and physical examination. Anesthetic plan, risks, benefits, alternatives, and personnel involved discussed with patient. Patient verbalized an understanding and agrees to proceed.       Cyndi Encarnacion MD  January 13, 2023  6:42 AM

## 2023-01-13 NOTE — ANESTHESIA POSTPROCEDURE EVALUATION
Department of Anesthesiology  Postprocedure Note    Patient: Sydnee Carreno  MRN: [id number]  YOB: 1970  Date of evaluation: 1/13/2023      Procedure Summary     Date: 01/13/23 Room / Location: [address]    Anesthesia Start: 0728 Anesthesia Stop: 7443    Procedure: [address] OF RIGHT LOWER EXTREMITY WITH STAB PHLEBECTOMIES (Right: Leg Lower) Diagnosis:       Varicose veins of right lower extremity with inflammation, with ulcer of ankle limited to breakdown of skin ([address]      (VARICOSE VEINS OF RIGHT LOWER EXTREMITY WITH INFLAMMATION WITH ULCER OF ANKLE, LIMITED BREAKDOWN OF SKIN)    Surgeons: Keith Barlow DO Responsible Provider: Mayra Hernandez MD    Anesthesia Type: general ASA Status: 3          Anesthesia Type: No value filed.     Joao Phase I: Joao Score: 10    Joao Phase II: Joao Score: 10      Anesthesia Post Evaluation    Patient location during evaluation: PACU  Patient participation: complete - patient participated  Level of consciousness: awake and alert  Pain score: 2  Airway patency: patent  Nausea & Vomiting: no nausea and no vomiting  Complications: no  Cardiovascular status: blood pressure returned to baseline  Respiratory status: acceptable  Hydration status: euvolemic

## 2023-01-13 NOTE — BRIEF OP NOTE
Brief Postoperative Note      Patient: Pavel Martinez  YOB: 1970  MRN: [id number]    Date of Procedure: 1/13/2023    Pre-Op Diagnosis: VARICOSE VEINS OF RIGHT LOWER EXTREMITY WITH INFLAMMATION WITH ULCER OF ANKLE, LIMITED BREAKDOWN OF SKIN    Post-Op Diagnosis: Same       Procedure(s):  RADIOFREQUENCY ABLATION OF GREATER SAPHENOUS VEIN OF RIGHT LOWER EXTREMITY WITH STAB PHLEBECTOMIES    Surgeon(s):  Home Longo DO    Assistant:  Surgical Assistant: Gary Bell    Anesthesia: General    Estimated Blood Loss (mL): less than 195     Complications: None    Specimens:   * No specimens in log *    Implants:  * No implants in log *      Drains: * No LDAs found *        Electronically signed by Home Longo DO on 1/13/2023 at 9:01 AM

## 2023-01-13 NOTE — PROGRESS NOTES
Pt discharged to home. Transported in wheelchair. Accompanied by spouse. Transported in personal vehicle. Discharge instructions and personal belongings given to pt. Explanation of discharge medications and instructions understood by verbal statement. No questions, comments or concerns at this time.        Electronically signed by Shi Celestin RN on 1/13/2023 at 10:28 AM

## 2023-01-13 NOTE — H&P
H&P Update    I have reviewed the history and physical and examined the patient and find no relevant changes. I have reviewed with the patient and/or family the risks, benefits, and alternatives to the procedure. I HAVE PRESENTED REASONABLE ALTERNATIVES TO THE PATIENT'S PROPOSED CARE, TREATMENT, AND SERVICES. THE DISCUSSION I HAVE DONE ENCOMPASSED RISKS, BENEFITS, AND SIDE EFFECTS RELATED TO THE ALTERNATIVES AND THE RISKS RELATED TO NOT RECEIVING THE PROPOSED CARE, TREATMENT, SERVICES. Patient:  Estella Jackson   :   1970    Intended Procedure: right greater saphenous vein ablation/phlebectomies [unfilled]    Vitals:    23 0658   BP: 118/69   Pulse: 72   Resp: 18   Temp: 97 °F (36.1 °C)   SpO2: 99%       Nursing notes reviewed and agreed. Medications reviewed  Allergies:    Allergies   Allergen Reactions    Amoxicillin Hives    Penicillins Hives         Post Procedure plan: home    Hoang Chong DO, FACS, FSVS, [address] [facility]

## 2023-01-13 NOTE — PROGRESS NOTES
Patient arrived to phase 2. Denies pain. Dressing to right leg clean, dry and intact.      Electronically signed by Raven Velazco RN on 1/13/2023 at 9:30 AM

## 2023-01-13 NOTE — DISCHARGE INSTRUCTIONS
Keep leg dressing on for 48 hours. Okay to remove if saturated. Follow up ultrasound to be arranged by office. Follow up in 2 weeks. Please call office with any questions or concerns ([phone]). Pain medicine sent to pharmacy; tylenol and ibuprofen encouraged as first line.

## 2023-01-13 NOTE — PROGRESS NOTES
Patient states her finace Wing Godfrey to pick her up. Wing Godfrey called by this RN no answer. [facility] Patient alert and oriented x4. Denies pain. VSS. Phase 2 called at this time.

## 2023-01-14 NOTE — OP NOTE
[address]                                OPERATIVE REPORT    PATIENT NAME: Sanna Beck                  :        1970  MED REC NO:   [id number]                          ROOM:  ACCOUNT NO:   [de-identified]                           ADMIT DATE: 2023  PROVIDER:     Melody Cramer DO    DATE OF PROCEDURE:  2023    PREOPERATIVE DIAGNOSIS:  Right lower extremity venous ulceration with  venous insufficiency. POSTOPERATIVE DIAGNOSIS:  Right lower extremity venous ulceration with  venous insufficiency. OPERATION PERFORMED:  Radiofrequency ablation of the right greater  saphenous vein with 18 stab phlebectomies. SURGEON:  Melody Cramer DO    ASSISTANT:  Ruchi Jauregui. ANESTHESIA:  General.    ESTIMATED BLOOD LOSS:  Less than 100 mL. COMPLICATIONS:  None apparent. INDICATIONS:  This is a 42-year-old female patient who has been dealing  with morbid excoriated skin lesions dorsum of her right foot. She has  been ongoing compression regimen and it helps slightly. She does have  evidence of deep and superficial _____ vessels, large distended varicose  veins particularly in the thigh area. She was referred for evaluation. We tried to treat her conservatively but she still had persistent  non-healing issues. Therefore, we decided to go ahead and proceed with  endovenous ablation and phlebectomy to see if that would aid in  treatment of her non-healing wound. All the risks, benefits, and  alternatives including pain, infection, bleeding, embolization,  thrombosis, deep vein thrombosis, saphenous neuralgia, thermal injury  were clearly reviewed with the patient. She understood the risks and  gave written informed consent. OPERATIVE PROCEDURE:  The patient was brought into the operating room  and placed supine on the table. She received preoperative antibiotics.    All bony prominences were padded. She underwent general anesthesia,  which was uncomplicated. The right lower extremity was prepped and  draped in the usual sterile fashion. Time-out was called for  indication, patient, and laterality. We began by marking out the  saphenous vein. Lots of varicosities that came off it. It was somewhat  small and large and distended in various places that was quite variable. We followed the saphenous vein down to below the knee. It appears to be  subfascial still at this point in time. We then accessed the saphenous  vein in this location. We then placed a 7-Yakut sheath. I then  proceeded to navigate up to the saphenofemoral junction. Withdrew the  catheter, which was a 7 x 60 cm catheter, withdrew within 3 cm from the  saphenofemoral junction. After this, then tumesced the entire length of  the catheter with a tumescent based solution, which was lidocaine,  saline and bicarbonate. After this was completed and we were satisfied  with the tumescence, we then began endothermal ablation. We did serial  ablations all the way from the saphenofemoral junction, which was once  again 3 cm distal all the way down to the level of the actual sheath  placement. After this, we examined the actual vein and we were pretty  satisfied with the result. There is no evidence of thrombosis into the  deep system. After this, we began to perform phlebectomies. We  performed a total of 18 phlebectomies. The phlebectomies were  undertaken using a #11 blade, which was used to make a small  micro-phlebectomy incision. Gaylord were then used to extrude the vein in  all locations in order to remove the vein from its native space. There  was an area of very thin and friable skin where the vein was actually  _____ onto the skin. This was actually scored and removed with a #15  blade and then closed with a #4 Vicryl suture.   After we had removed  about 18 phlebectomy sites worth of veins, we were reasonably satisfied  with the result. We then irrigated all those wounds. We then used  benzoin to place around the actual phlebectomy sites and Steri-Strips  were applied to the skin. The leg was then wrapped in fluffs, Kerlix  and two Coban wraps. The patient was extubated in the operating room  and taken to the recovery area in stable condition. I was present and  performed all critical aspects of this procedure. There were no  immediate complications. All sponge and needle counts were correct x2.         Henry Eden DO    D: 01/13/2023 9:09:23       T: 01/13/2023 14:05:18     [email or web address]  Job#: [id number]     Doc#: [id number]    CC:

## 2023-01-17 ENCOUNTER — HOSPITAL ENCOUNTER (OUTPATIENT)
Dept: VASCULAR LAB | Age: 53
Discharge: HOME | End: 2023-01-17
Payer: MEDICAID

## 2023-01-17 DIAGNOSIS — I83.213 VARICOSE VEINS OF RIGHT LOWER EXTREMITY WITH INFLAMMATION, WITH ULCER OF ANKLE LIMITED TO BREAKDOWN OF SKIN (HCC): ICD-10-CM

## 2023-01-17 DIAGNOSIS — L97.311 VARICOSE VEINS OF RIGHT LOWER EXTREMITY WITH INFLAMMATION, WITH ULCER OF ANKLE LIMITED TO BREAKDOWN OF SKIN (HCC): ICD-10-CM

## 2023-01-17 PROCEDURE — 93971 EXTREMITY STUDY: CPT

## 2023-01-26 ENCOUNTER — OFFICE VISIT (OUTPATIENT)
Dept: VASCULAR SURGERY | Age: 53
End: 2023-01-26

## 2023-01-26 VITALS — BODY MASS INDEX: 48.18 KG/M2 | HEIGHT: 61 IN

## 2023-01-26 DIAGNOSIS — Z09 POSTOP CHECK: Primary | ICD-10-CM

## 2023-01-26 DIAGNOSIS — G89.18 POST-OP PAIN: ICD-10-CM

## 2023-01-26 PROCEDURE — 99024 POSTOP FOLLOW-UP VISIT: CPT | Performed by: STUDENT IN AN ORGANIZED HEALTH CARE EDUCATION/TRAINING PROGRAM

## 2023-01-26 RX ORDER — TRAMADOL HYDROCHLORIDE 50 MG/1
50 TABLET ORAL EVERY 4 HOURS PRN
Qty: 30 TABLET | Refills: 0 | Status: SHIPPED | OUTPATIENT
Start: 2023-01-26 | End: 2023-01-31

## 2023-01-26 NOTE — PROGRESS NOTES
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 female,Established patient, here for evaluation of the following chief complaint(s):  Post-Op Check         ASSESSMENT/PLAN:  1. Postop check    This is a 50year old female patient s/p right GSV ablation with phlebectomies. She does have some subdermal hematoma in her calf that corresponds to her pain. Encouraged daily ACE wrap. Okay to try tramadol since she states tylenol and NSAIDs are not helping her and it is affecting her day to day life. If no relief then return for further discussion. One time prescription provided. All questions answered. Recommend long term bilateral lower extremity compression. Subjective   SUBJECTIVE/OBJECTIVE:  This is a 50year old female patient who presents as post op from right GSV ablation with phlebectomies. Overall she is doing okay. Duplex confirmed ablated vein and no DVT. She bruised from the phlebectomy sites and endorses some pressure pain in the right anterior portion of her lower leg. Objective   Physical Exam  Constitutional:       Appearance: She is obese. Cardiovascular:      Rate and Rhythm: Normal rate and regular rhythm. Pulses: Normal pulses. Skin:     General: Skin is warm and dry. Findings: Bruising (along phlebectomy sites) present. Comments: Residual evidence of subdermal hematoma from phlebectomies   Neurological:      Mental Status: She is alert.         Mahsa Caruso DO, FACS, FSVS, [address] [facility]

## 2023-02-09 ENCOUNTER — APPOINTMENT (OUTPATIENT)
Dept: GENERAL RADIOLOGY | Age: 53
End: 2023-02-09
Payer: MEDICAID

## 2023-02-09 ENCOUNTER — HOSPITAL ENCOUNTER (EMERGENCY)
Age: 53
Discharge: HOME | End: 2023-02-09
Payer: MEDICAID

## 2023-02-09 VITALS
HEIGHT: 61 IN | HEART RATE: 91 BPM | DIASTOLIC BLOOD PRESSURE: 84 MMHG | SYSTOLIC BLOOD PRESSURE: 134 MMHG | OXYGEN SATURATION: 98 % | BODY MASS INDEX: 46.87 KG/M2 | WEIGHT: 248.24 LBS | RESPIRATION RATE: 20 BRPM | TEMPERATURE: 98.1 F

## 2023-02-09 DIAGNOSIS — S61.459A DOG BITE OF HAND, INITIAL ENCOUNTER: Primary | ICD-10-CM
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
## 2023-02-09 DIAGNOSIS — Z23 NEED FOR TETANUS BOOSTER: ICD-10-CM

## 2023-02-09 PROCEDURE — 6360000002 HC RX W HCPCS: Performed by: NURSE PRACTITIONER

## 2023-02-09 PROCEDURE — 99284 EMERGENCY DEPT VISIT MOD MDM: CPT

## 2023-02-09 PROCEDURE — 90715 TDAP VACCINE 7 YRS/> IM: CPT | Performed by: NURSE PRACTITIONER

## 2023-02-09 PROCEDURE — 90471 IMMUNIZATION ADMIN: CPT | Performed by: NURSE PRACTITIONER

## 2023-02-09 PROCEDURE — 73130 X-RAY EXAM OF HAND: CPT

## 2023-02-09 PROCEDURE — 6370000000 HC RX 637 (ALT 250 FOR IP): Performed by: NURSE PRACTITIONER

## 2023-02-09 RX ORDER — PREDNISONE 10 MG/1
10 TABLET ORAL DAILY
Qty: 10 TABLET | Refills: 0 | Status: SHIPPED | OUTPATIENT
Start: 2023-02-09 | End: 2023-02-09

## 2023-02-09 RX ORDER — AMOXICILLIN AND CLAVULANATE POTASSIUM 875; 125 MG/1; MG/1
1 TABLET, FILM COATED ORAL 2 TIMES DAILY
Qty: 14 TABLET | Refills: 0 | Status: SHIPPED | OUTPATIENT
Start: 2023-02-09 | End: 2023-02-16

## 2023-02-09 RX ORDER — PREDNISONE 1 MG/1
5 TABLET ORAL DAILY
Qty: 10 TABLET | Refills: 0 | Status: SHIPPED | OUTPATIENT
Start: 2023-02-09 | End: 2023-02-09

## 2023-02-09 RX ORDER — FAMOTIDINE 20 MG/1
20 TABLET, FILM COATED ORAL 2 TIMES DAILY PRN
Qty: 60 TABLET | Refills: 0 | Status: SHIPPED | OUTPATIENT
Start: 2023-02-09 | End: 2023-02-16

## 2023-02-09 RX ORDER — PREDNISONE 10 MG/1
40 TABLET ORAL DAILY
Qty: 20 TABLET | Refills: 0 | Status: SHIPPED | OUTPATIENT
Start: 2023-02-09 | End: 2023-02-14

## 2023-02-09 RX ORDER — AMOXICILLIN AND CLAVULANATE POTASSIUM 875; 125 MG/1; MG/1
1 TABLET, FILM COATED ORAL EVERY 12 HOURS SCHEDULED
Status: DISCONTINUED | OUTPATIENT
Start: 2023-02-09 | End: 2023-02-09 | Stop reason: HOSPADM

## 2023-02-09 RX ORDER — PREDNISONE 10 MG/1
TABLET ORAL
Qty: 30 TABLET | Refills: 0 | Status: SHIPPED | OUTPATIENT
Start: 2023-02-09 | End: 2023-02-09

## 2023-02-09 RX ORDER — PREDNISONE 20 MG/1
20 TABLET ORAL DAILY
Qty: 10 TABLET | Refills: 0 | Status: SHIPPED | OUTPATIENT
Start: 2023-02-09 | End: 2023-02-09

## 2023-02-09 RX ORDER — IBUPROFEN 600 MG/1
600 TABLET ORAL 3 TIMES DAILY PRN
Qty: 15 TABLET | Refills: 0 | Status: SHIPPED | OUTPATIENT
Start: 2023-02-09 | End: 2023-02-14

## 2023-02-09 RX ORDER — IBUPROFEN 600 MG/1
600 TABLET ORAL ONCE
Status: COMPLETED | OUTPATIENT
Start: 2023-02-09 | End: 2023-02-09

## 2023-02-09 RX ORDER — PREDNISONE 10 MG/1
TABLET ORAL
Qty: 30 TABLET | Refills: 0 | Status: CANCELLED | OUTPATIENT
Start: 2023-02-09 | End: 2023-02-19

## 2023-02-09 RX ORDER — ACETAMINOPHEN 500 MG
500 TABLET ORAL 4 TIMES DAILY PRN
Qty: 28 TABLET | Refills: 0 | Status: SHIPPED | OUTPATIENT
Start: 2023-02-09 | End: 2023-02-16

## 2023-02-09 RX ORDER — ACETAMINOPHEN 500 MG
1000 TABLET ORAL ONCE
Status: COMPLETED | OUTPATIENT
Start: 2023-02-09 | End: 2023-02-09

## 2023-02-09 RX ADMIN — ACETAMINOPHEN 1000 MG: 500 TABLET ORAL at 19:59

## 2023-02-09 RX ADMIN — IBUPROFEN 600 MG: 600 TABLET, FILM COATED ORAL at 19:59

## 2023-02-09 RX ADMIN — AMOXICILLIN AND CLAVULANATE POTASSIUM 1 TABLET: 875; 125 TABLET, FILM COATED ORAL at 19:59

## 2023-02-09 RX ADMIN — TETANUS TOXOID, REDUCED DIPHTHERIA TOXOID AND ACELLULAR PERTUSSIS VACCINE, ADSORBED 0.5 ML: 5; 2.5; 8; 8; 2.5 SUSPENSION INTRAMUSCULAR at 19:59

## 2023-02-09 ASSESSMENT — ENCOUNTER SYMPTOMS
VOMITING: 0
COUGH: 0
ANAL BLEEDING: 0
EYE PAIN: 0
COLOR CHANGE: 1
SORE THROAT: 0
SHORTNESS OF BREATH: 0
ABDOMINAL PAIN: 0
NAUSEA: 0
BACK PAIN: 0

## 2023-02-09 ASSESSMENT — PAIN - FUNCTIONAL ASSESSMENT
PAIN_FUNCTIONAL_ASSESSMENT: ACTIVITIES ARE NOT PREVENTED
PAIN_FUNCTIONAL_ASSESSMENT: 0-10
PAIN_FUNCTIONAL_ASSESSMENT: 0-10

## 2023-02-09 ASSESSMENT — PAIN SCALES - GENERAL
PAINLEVEL_OUTOF10: 5
PAINLEVEL_OUTOF10: 9
PAINLEVEL_OUTOF10: 5
PAINLEVEL_OUTOF10: 9

## 2023-02-09 ASSESSMENT — PAIN DESCRIPTION - LOCATION: LOCATION: HAND

## 2023-02-09 ASSESSMENT — PAIN DESCRIPTION - FREQUENCY: FREQUENCY: CONTINUOUS

## 2023-02-09 ASSESSMENT — PAIN DESCRIPTION - ORIENTATION: ORIENTATION: RIGHT

## 2023-02-09 ASSESSMENT — PAIN DESCRIPTION - DESCRIPTORS: DESCRIPTORS: ACHING

## 2023-02-09 ASSESSMENT — PAIN DESCRIPTION - ONSET: ONSET: ON-GOING

## 2023-02-10 NOTE — DISCHARGE INSTRUCTIONS
Return to the emergency department for new or worsening symptoms including, not limited to, developing fever, chills, sweats, inability to tolerate food or drink, increased pain, spreading of the redness, pus drainage, or other symptoms/concerns. Follow-up with your primary care doctor for reevaluation in next 1-2 days for reevaluation. If your symptoms worsen please call the hand specialist at the provided number to schedule an appointment for evaluation.

## 2023-02-10 NOTE — ED NOTES
Patient presents to the ED via walk in for dog bite to right hand with redness surrounding site. Patient states she was bit Tuesday by her dog, and now hand has swollen slightly and is warm/red. Patient denies fevers. Patient alert and oriented, respirations even and easy.            Daquan Manzano RN  02/09/23 2020

## 2023-02-10 NOTE — ED PROVIDER NOTES
[facility] ENCOUNTER        Pt Name: Kristina You  MRN: [id number]  [place] 1970  Date of evaluation: 2/9/2023  Provider: NADIA Singh CNP  PCP: Meg Green thctr  Note Started: 8:22 PM EST 2/9/23      MONICA. I have evaluated this patient. My supervising physician was available for consultation. Did discuss this patient with my attending physician, Dr. Ceasar Andersen. He did not evaluate the patient however. CHIEF COMPLAINT       Chief Complaint   Patient presents with    Animal Bite     2 days ago to R hand, by patient's dog       HISTORY OF PRESENT ILLNESS: 1 or more Elements     History from : Patient    Limitations to history : None    Kristina You is a 50 y.o. nontoxic, well-appearing, right-hand-dominant female who presents to the emergency department with \"shooting\" 9/10 right hand pain status post she was bitten by one of her immunized July was 2 days ago as she was reportedly attempting to break up a fight between her 2 dogs as they were fighting because her granddaughter was giving one of the dog's attention and\" the other became jealous\". She denies nausea, vomiting, fever, chills, sweats, diarrhea, drainage from the puncture/hand, or other symptoms/concerns    Nursing Notes were all reviewed and agreed with or any disagreements were addressed in the HPI. REVIEW OF SYSTEMS :      Review of Systems   Constitutional:  Negative for chills, diaphoresis, fatigue and fever. HENT:  Negative for congestion and sore throat. Eyes:  Negative for pain and visual disturbance. Respiratory:  Negative for cough and shortness of breath. Cardiovascular:  Negative for chest pain and leg swelling. Gastrointestinal:  Negative for abdominal pain, anal bleeding, nausea and vomiting. Genitourinary:  Negative for difficulty urinating, dysuria, frequency and urgency.    Musculoskeletal:  Positive for arthralgias (Limited to the right hand). Negative for back pain and neck pain. Skin:  Positive for color change and wound. Negative for rash. Neurological:  Negative for dizziness and light-headedness. Hematological:  Does not bruise/bleed easily. Positives and Pertinent negatives as per HPI. SURGICAL HISTORY     Past Surgical History:   Procedure Laterality Date     SECTION      ESOPHAGOGASTRODUODENOSCOPY      SAPHENOUS VEIN ABLATION Right 2023    RADIOFREQUENCY ABLATION OF GREATER SAPHENOUS VEIN OF RIGHT LOWER EXTREMITY WITH STAB PHLEBECTOMIES performed by Ilene Day DO at [facility]       Previous Medications    AMITRIPTYLINE (ELAVIL) 25 MG TABLET    Take 25 mg by mouth nightly    CYCLOBENZAPRINE (FLEXERIL) 10 MG TABLET    Take 10 mg by mouth 3 times daily as needed for Muscle spasms    LORATADINE (CLARITIN) 10 MG TABLET    TAKE 1 TABLET EVERY DAY BY ORAL ROUTE AS NEEDED. SUMATRIPTAN (IMITREX) 100 MG TABLET    Take 100 mg by mouth as needed    VITAMIN B-12 (CYANOCOBALAMIN) 500 MCG TABLET    Take 500 mcg by mouth daily       ALLERGIES     Penicillins    FAMILYHISTORY       Family History   Problem Relation Age of Onset    High Blood Pressure Mother     Heart Disease Mother         SOCIAL HISTORY       Social History     Tobacco Use    Smoking status: Never    Smokeless tobacco: Never   Vaping Use    Vaping Use: Never used   Substance Use Topics    Alcohol use: No    Drug use: No       SCREENINGS        Lima Coma Scale  Eye Opening: Spontaneous  Best Verbal Response: Oriented  Best Motor Response: Obeys commands  Dimitris Coma Scale Score: 15                CIWA Assessment  BP: 134/84  Heart Rate: 91           PHYSICAL EXAM  1 or more Elements     ED Triage Vitals [23]   BP Temp Temp Source Heart Rate Resp SpO2 Height Weight   134/84 98.1 °F (36.7 °C) Oral 91 20 98 % 5' 1\" (1.549 m) 248 lb 3.8 oz (112.6 kg)       Physical Exam  Vitals and nursing note reviewed. Constitutional:       Appearance: Normal appearance. She is not diaphoretic. HENT:      Head: Normocephalic and atraumatic. Right Ear: External ear normal.      Left Ear: External ear normal.      Nose: Nose normal.      Mouth/Throat:      Mouth: Mucous membranes are moist.   Eyes:      General:         Right eye: No discharge. Left eye: No discharge. Extraocular Movements: Extraocular movements intact. Cardiovascular:      Rate and Rhythm: Normal rate and regular rhythm. Heart sounds: No murmur heard. No friction rub. No gallop. Pulmonary:      Effort: Pulmonary effort is normal. No respiratory distress. Breath sounds: No wheezing, rhonchi or rales. Abdominal:      Palpations: Abdomen is soft. Tenderness: There is no abdominal tenderness. There is no right CVA tenderness, left CVA tenderness, guarding or rebound. Musculoskeletal:         General: Swelling, tenderness and signs of injury (+ Healing puncture noted to the dorsum of the right hand) present. No deformity. Normal range of motion. Cervical back: Normal range of motion and neck supple. Right lower leg: No edema. Left lower leg: No edema. Comments: 2+ radial and ulnar pulses palpated. Skin:     General: Skin is warm and dry. Capillary Refill: Capillary refill takes less than 2 seconds. Findings: Erythema present. Neurological:      Mental Status: She is alert and oriented to person, place, and time. Sensory: No sensory deficit. Motor: No weakness. Psychiatric:         Mood and Affect: Mood normal.         Behavior: Behavior normal.             DIAGNOSTIC RESULTS   LABS:    Labs Reviewed - No data to display    When ordered only abnormal lab results are displayed. All other labs were within normal range or not returned as of this dictation. EKG:  When ordered, EKG's are interpreted by the Emergency Department Physician in the absence of a cardiologist.  Please see their note for interpretation of EKG. RADIOLOGY:   Non-plain film images such as CT, Ultrasound and MRI are read by the radiologist. Plain radiographic images are visualized and preliminarily interpreted by the ED Provider with the below findings:      Interpretation per the Radiologist below, if available at the time of this note:    XR HAND RIGHT (MIN 3 VIEWS)   Final Result   No acute osseous abnormality. No foreign body identified           XR HAND RIGHT (MIN 3 VIEWS)    Result Date: 2/9/2023  EXAMINATION: THREE XRAY VIEWS OF THE RIGHT HAND 2/9/2023 7:57 pm COMPARISON: None. HISTORY: ORDERING SYSTEM PROVIDED HISTORY: r/o retained FB/dog bite TECHNOLOGIST PROVIDED HISTORY: Reason for exam:->r/o retained FB/dog bite Reason for Exam: dog bite FINDINGS: There is no evidence of acute fracture. There is normal alignment. No acute joint abnormality. No focal osseous lesion. No focal soft tissue abnormality. No acute osseous abnormality. No foreign body identified           PROCEDURES   Unless otherwise noted below, none     Procedures    CRITICAL CARE TIME (.cctime)   0 minutes    Ericlaabdi Naresh   Mrs. Angie Lu has a past medical history of Arthritis, Back pain, Headache, and Migraine. Chronic Conditions affecting Care: None    EMERGENCY DEPARTMENT COURSE and DIFFERENTIAL DIAGNOSIS/MDM:   Vitals:    Vitals:    02/09/23 1937   BP: 134/84   Pulse: 91   Resp: 20   Temp: 98.1 °F (36.7 °C)   TempSrc: Oral   SpO2: 98%   Weight: 248 lb 3.8 oz (112.6 kg)   Height: 5' 1\" (1.549 m)     Alternate diagnoses were considered less likely based on history and physical. Considered, erysipelas, abscess, necrotizing fasciitis, ulceration, neurovascular injury, gout, Retained foreign body, Tendinitis, Osteomyelitis, other. Patient presents emergency department status post she was bitten by one of her 2 dogs 2 days ago and is now experiencing \"shooting\" 9/10 pain.   No systemic signs of infection, good range of motion of the hand with brisk cap refill, full sensation, and 2+ radial and ulnar pulses. I will obtain an x-ray of the patient's right hand to ensure no retained foreign body. There is no crepitus. There is no pain out of proportion to exam. Work-up pending. Patient medicated as below. Patient was given the following medications:  Medications   amoxicillin-clavulanate (AUGMENTIN) 875-125 MG per tablet 1 tablet (1 tablet Oral Given 2/9/23 1959)   ibuprofen (ADVIL;MOTRIN) tablet 600 mg (600 mg Oral Given 2/9/23 1959)   acetaminophen (TYLENOL) tablet 1,000 mg (1,000 mg Oral Given 2/9/23 1959)   Tetanus-Diphth-Acell Pertussis (BOOSTRIX) injection 0.5 mL (0.5 mLs IntraMUSCular Given 2/9/23 1959)     XR right hand: As interpreted by me and confirmed by radiology identifying no acute osseous abnormality. No foreign body identified. Therefore I have low suspicion for the presence of emergent medical condition at this time and feel that the patient is both safe and appropriate for discharged home with outpatient follow-up with her PCP In the next 1-2 days and with the orthopedic specialist If symptoms worsen or fail to improve within the next 2-3 days. I prescribed medications as noted below for symptomatic relief. There is significant edema and erythema. No warmth, no drainage from the puncture on the hand. Patient has no systemic symptoms to indicate infection in the bloodstream/sepsis. Therefore, she will be trialed on outpatient antibiotics-Augmentin. First dose given here. Patient also medicated as below. Prescriptions as noted below. I feel that the risk of additional laboratory testing, imaging, and/admission outweighs any potential benefit at this time. Therefore patient will be trialed on outpatient basis. Again, strict return precautions. Patient verbalized understand agree with plan for discharge and follow-up.         Is this patient to be included in the SEP-1 Core Measure due to severe sepsis or septic shock? No   Exclusion criteria - the patient is NOT to be included for SEP-1 Core Measure due to:  2+ SIRS criteria are not met    CONSULTS: (Who and What was discussed)  None  Discussion with Other Profesionals : None    Social Determinants : None    Records Reviewed : None    CC/HPI Summary, DDx, ED Course, and Reassessment:  On reassessment the patient is resting company on stretcher with eyes open with stable vital signs. She endorses 5/10 discomfort. Disposition Considerations (include 1 Tests not done, Shared Decision Making, Pt Expectation of Test or Tx.):     Therefore, the patient be discharged home with outpatient follow-up. I am the Primary Clinician of Record. FINAL IMPRESSION      1. Dog bite of hand, initial encounter    2.  Need for tetanus booster          DISPOSITION/PLAN     DISPOSITION     PATIENT REFERRED TO:  DOMINGUEZ [address]  [address] [facility]  [phone]    Call in 1 day      [place] [address]  [phone]  Go to   As needed    Ewelina Carballo MD  [address] [place] [address]  [phone]    Call in 2 days  As needed, If symptoms worsen, or fail to improve in this duration of time      DISCHARGE MEDICATIONS:  New Prescriptions    ACETAMINOPHEN (TYLENOL) 500 MG TABLET    Take 1 tablet by mouth 4 times daily as needed for Pain    AMOXICILLIN-CLAVULANATE (AUGMENTIN) 875-125 MG PER TABLET    Take 1 tablet by mouth 2 times daily for 7 days    IBUPROFEN (ADVIL;MOTRIN) 600 MG TABLET    Take 1 tablet by mouth 3 times daily as needed for Pain With meals       DISCONTINUED MEDICATIONS:  Discontinued Medications    No medications on file              (Please note that portions of this note were completed with a voice recognition program.  Efforts were made to edit the dictations but occasionally words are mis-transcribed.)    NADIA Mistry CNP (electronically signed)           NADAI Mistry CNP  02/09/23 [id number]

## 2023-02-10 NOTE — ED NOTES
Area of redness around wound site marked and dated.   Patient instructed on wound care and verbalizes understanding     Chai Overton RN  02/09/23 [id number]

## 2023-02-22 ENCOUNTER — TELEPHONE (OUTPATIENT)
Dept: SURGERY | Age: 53
End: 2023-02-22

## 2023-02-22 DIAGNOSIS — M79.604 PAIN AND SWELLING OF RIGHT LOWER EXTREMITY: Primary | ICD-10-CM

## 2023-02-22 DIAGNOSIS — M79.89 PAIN AND SWELLING OF RIGHT LOWER EXTREMITY: Primary | ICD-10-CM

## 2023-02-23 ENCOUNTER — PROCEDURE VISIT (OUTPATIENT)
Dept: SURGERY | Age: 53
End: 2023-02-23
Payer: MEDICAID

## 2023-02-23 DIAGNOSIS — M79.604 PAIN AND SWELLING OF RIGHT LOWER EXTREMITY: ICD-10-CM

## 2023-02-23 DIAGNOSIS — M79.89 PAIN AND SWELLING OF RIGHT LOWER EXTREMITY: ICD-10-CM

## 2023-02-23 PROCEDURE — 93971 EXTREMITY STUDY: CPT | Performed by: STUDENT IN AN ORGANIZED HEALTH CARE EDUCATION/TRAINING PROGRAM

## 2023-03-03 ENCOUNTER — TELEPHONE (OUTPATIENT)
Dept: ORTHOPEDIC SURGERY | Age: 53
End: 2023-03-03

## 2023-03-03 NOTE — TELEPHONE ENCOUNTER
Left message for patient to return call if they would like to schedule a follow up appointment. Upon return call please schedule appt with Dr. Faye Paredes

## 2023-03-06 ENCOUNTER — OFFICE VISIT (OUTPATIENT)
Dept: VASCULAR SURGERY | Age: 53
End: 2023-03-06
Payer: MEDICAID

## 2023-03-06 DIAGNOSIS — I83.812 VARICOSE VEINS OF LEFT LOWER EXTREMITY WITH PAIN: Primary | ICD-10-CM

## 2023-03-06 DIAGNOSIS — R22.41 LOCALIZED SWELLING OF RIGHT LOWER EXTREMITY: ICD-10-CM

## 2023-03-06 PROCEDURE — 3017F COLORECTAL CA SCREEN DOC REV: CPT | Performed by: STUDENT IN AN ORGANIZED HEALTH CARE EDUCATION/TRAINING PROGRAM

## 2023-03-06 PROCEDURE — 1036F TOBACCO NON-USER: CPT | Performed by: STUDENT IN AN ORGANIZED HEALTH CARE EDUCATION/TRAINING PROGRAM

## 2023-03-06 PROCEDURE — 99213 OFFICE O/P EST LOW 20 MIN: CPT | Performed by: STUDENT IN AN ORGANIZED HEALTH CARE EDUCATION/TRAINING PROGRAM

## 2023-03-06 PROCEDURE — G8417 CALC BMI ABV UP PARAM F/U: HCPCS | Performed by: STUDENT IN AN ORGANIZED HEALTH CARE EDUCATION/TRAINING PROGRAM

## 2023-03-06 PROCEDURE — G8427 DOCREV CUR MEDS BY ELIG CLIN: HCPCS | Performed by: STUDENT IN AN ORGANIZED HEALTH CARE EDUCATION/TRAINING PROGRAM

## 2023-03-06 PROCEDURE — G8484 FLU IMMUNIZE NO ADMIN: HCPCS | Performed by: STUDENT IN AN ORGANIZED HEALTH CARE EDUCATION/TRAINING PROGRAM

## 2023-03-06 NOTE — PROGRESS NOTES
James Metz (:  1970) is a 50 y.o. female,Established patient, here for evaluation of the following chief complaint(s):  Post-Op Check         ASSESSMENT/PLAN:  1. Varicose veins of left lower extremity with pain  2. Localized swelling of right lower extremity    This is a 50year old female patient who presents for discussion about right leg localized swelling and left leg varicosities with associated pain. The right leg swelling is minimal and has no unusual appearance. Recommend stocking use. In terms of her left would still recommend compression stocking use as she does not have a wound like she did on the right leg. Will obtain venous reflux test to evaluate in the interim. All questions answered. Subjective   SUBJECTIVE/OBJECTIVE:  This is a 50year old female patient who presents to the office today to discuss leg swelling over right anterior leg. She is s/p ablation with phlebectomies. Overall she did well from that procedure. She however endorses localized swelling along the anterior shin. No pain. No skin irritation. Duplex performed which was personally reviewed, showing no evidence of DVT. She states that she is starting to notice worsening varicosities now in her left lower extremity. Has yet to trial a new set of stockings. Objective   Physical Exam  Constitutional:       Appearance: She is obese. Cardiovascular:      Rate and Rhythm: Normal rate and regular rhythm. Pulses: Normal pulses. Pulmonary:      Effort: Pulmonary effort is normal.   Musculoskeletal:         General: Swelling (localized over anterior shin; unremarkable external appearance, no adjacent varicosities) present. Skin:     General: Skin is warm and dry. Comments: Clusters of varicose veins in left lower extremity sporadically over entire left leg circumference    Neurological:      Mental Status: She is alert.           Abbie Wellington, , FACS, FSVS, [address] [facility] Endovascular Surgery

## 2023-03-08 DIAGNOSIS — I83.812 VARICOSE VEINS OF LEFT LOWER EXTREMITY WITH PAIN: Primary | ICD-10-CM

## 2023-03-16 ENCOUNTER — PROCEDURE VISIT (OUTPATIENT)
Dept: SURGERY | Age: 53
End: 2023-03-16

## 2023-03-16 DIAGNOSIS — I83.812 VARICOSE VEINS OF LEFT LOWER EXTREMITY WITH PAIN: ICD-10-CM

## 2023-03-17 ENCOUNTER — TELEPHONE (OUTPATIENT)
Dept: SURGERY | Age: 53
End: 2023-03-17

## 2023-03-28 NOTE — FLOWSHEET NOTE
under the care of a heart doctor or specialist doctor, please be aware that you may be asked to them for clearance    You may be asked to stop blood thinners such as Coumadin, Plavix, Fragmin, Lovenox, etc., or any anti-inflammatories such as:  Aspirin, Ibuprofen, Advil, Naproxen prior to your surgery. We also ask that you stop any OTC medications such as fish oil, vitamin E, glucosamine, garlic, Multivitamins, COQ 10, etc.    We ask that you do not smoke 24 hours prior to surgery  We ask that you do not  drink any alcoholic beverages 24 hours prior to surgery     You must make arrangements for a responsible adult to take you home after your surgery. For your safety you will not be allowed to leave alone or drive yourself home. Your surgery will be cancelled if you do not have a ride home. Also for your safety, it is strongly suggested that someone stay with you the first 24 hours after your surgery. A parent or legal guardian must accompany a child scheduled for surgery and plan to stay at the hospital until the child is discharged. Please do not bring other children with you. For your comfort, please wear simple loose fitting clothing to the hospital.  Please do not bring valuables. Do not wear any make-up or nail polish on your fingers or toes. For your safety, please do not wear any jewelry or body piercing's on the day of surgery. All jewelry must be removed. If you have dentures, they will be removed before going to operating room. For your convenience, we will provide you with a container. If you wear contact lenses or glasses, they will be removed, please bring a case for them. If you have a living will and a durable power of  for healthcare, please bring in a copy.      As part of our patient safety program to minimize surgical site infections, we ask you to do the following:    Please notify your surgeon if you develop any illness between         now and the day

## 2023-03-28 NOTE — PROGRESS NOTES
DOS 23   74    Pre-op H&P:  Pt. Is going to [facility] on 3/30[facility]23 at 0830. She did not know the phone number. Given our PAT fax number. Please make sure that she went to this appt. And that she is medically cleared. 3/30/23 Update: Spoke to [place], [place] ( [phone]). She stated the H&P was completed-pending lab work for clearance. She was given Dr Price Stevens office number as well as [facility] PAT phone & fax. Requested that her office contact Dr Luisa Sin office as well as the PAT if patient is not cleared ( they will fax H&P if cleared for surgery)    UPDATE: Called above spoke with nurse Tessa Dennis, she states labs were just drawn today and will fax H&P and lab results Monday morning.     Update 4/3- H.P received for DOS, scanned into epic

## 2023-04-03 ENCOUNTER — ANESTHESIA EVENT (OUTPATIENT)
Dept: OPERATING ROOM | Age: 53
End: 2023-04-03
Payer: MEDICAID

## 2023-04-04 ENCOUNTER — ANESTHESIA (OUTPATIENT)
Dept: OPERATING ROOM | Age: 53
End: 2023-04-04
Payer: MEDICAID

## 2023-04-04 ENCOUNTER — HOSPITAL ENCOUNTER (OUTPATIENT)
Age: 53
Setting detail: OUTPATIENT SURGERY
Discharge: HOME | End: 2023-04-04
Attending: STUDENT IN AN ORGANIZED HEALTH CARE EDUCATION/TRAINING PROGRAM | Admitting: STUDENT IN AN ORGANIZED HEALTH CARE EDUCATION/TRAINING PROGRAM
Payer: MEDICAID

## 2023-04-04 VITALS
BODY MASS INDEX: 45.68 KG/M2 | TEMPERATURE: 97.4 F | HEART RATE: 85 BPM | WEIGHT: 241.96 LBS | SYSTOLIC BLOOD PRESSURE: 106 MMHG | HEIGHT: 61 IN | OXYGEN SATURATION: 100 % | DIASTOLIC BLOOD PRESSURE: 63 MMHG | RESPIRATION RATE: 20 BRPM

## 2023-04-04 DIAGNOSIS — G89.18 POST-OP PAIN: Primary | ICD-10-CM

## 2023-04-04 PROBLEM — I83.812 VARICOSE VEINS OF LEFT LOWER EXTREMITY WITH PAIN: Status: ACTIVE | Noted: 2023-04-04

## 2023-04-04 LAB
ANION GAP SERPL CALCULATED.3IONS-SCNC: 9 MMOL/L (ref 3–16)
BUN SERPL-MCNC: 14 MG/DL (ref 7–20)
CALCIUM SERPL-MCNC: 9 MG/DL (ref 8.3–10.6)
CHLORIDE SERPL-SCNC: 106 MMOL/L (ref 99–110)
CO2 SERPL-SCNC: 25 MMOL/L (ref 21–32)
CREAT SERPL-MCNC: 0.7 MG/DL (ref 0.6–1.1)
DEPRECATED RDW RBC AUTO: 14 % (ref 12.4–15.4)
GFR SERPLBLD CREATININE-BSD FMLA CKD-EPI: >60 ML/MIN/{1.73_M2}
GLUCOSE SERPL-MCNC: 112 MG/DL (ref 70–99)
HCT VFR BLD AUTO: 40.1 % (ref 36–48)
HGB BLD-MCNC: 13.7 G/DL (ref 12–16)
MCH RBC QN AUTO: 30.2 PG (ref 26–34)
MCHC RBC AUTO-ENTMCNC: 34.1 G/DL (ref 31–36)
MCV RBC AUTO: 88.6 FL (ref 80–100)
PLATELET # BLD AUTO: 216 K/UL (ref 135–450)
PMV BLD AUTO: 9.3 FL (ref 5–10.5)
POTASSIUM SERPL-SCNC: 3.9 MMOL/L (ref 3.5–5.1)
RBC # BLD AUTO: 4.53 M/UL (ref 4–5.2)
SODIUM SERPL-SCNC: 140 MMOL/L (ref 136–145)
WBC # BLD AUTO: 7 K/UL (ref 4–11)

## 2023-04-04 PROCEDURE — 3700000001 HC ADD 15 MINUTES (ANESTHESIA): Performed by: STUDENT IN AN ORGANIZED HEALTH CARE EDUCATION/TRAINING PROGRAM

## 2023-04-04 PROCEDURE — 85027 COMPLETE CBC AUTOMATED: CPT

## 2023-04-04 PROCEDURE — C1894 INTRO/SHEATH, NON-LASER: HCPCS | Performed by: STUDENT IN AN ORGANIZED HEALTH CARE EDUCATION/TRAINING PROGRAM

## 2023-04-04 PROCEDURE — 2580000003 HC RX 258: Performed by: STUDENT IN AN ORGANIZED HEALTH CARE EDUCATION/TRAINING PROGRAM

## 2023-04-04 PROCEDURE — 37766 PHLEB VEINS - EXTREM 20+: CPT | Performed by: STUDENT IN AN ORGANIZED HEALTH CARE EDUCATION/TRAINING PROGRAM

## 2023-04-04 PROCEDURE — 7100000010 HC PHASE II RECOVERY - FIRST 15 MIN: Performed by: STUDENT IN AN ORGANIZED HEALTH CARE EDUCATION/TRAINING PROGRAM

## 2023-04-04 PROCEDURE — 7100000000 HC PACU RECOVERY - FIRST 15 MIN: Performed by: STUDENT IN AN ORGANIZED HEALTH CARE EDUCATION/TRAINING PROGRAM

## 2023-04-04 PROCEDURE — 2500000003 HC RX 250 WO HCPCS: Performed by: STUDENT IN AN ORGANIZED HEALTH CARE EDUCATION/TRAINING PROGRAM

## 2023-04-04 PROCEDURE — 6360000002 HC RX W HCPCS: Performed by: STUDENT IN AN ORGANIZED HEALTH CARE EDUCATION/TRAINING PROGRAM

## 2023-04-04 PROCEDURE — 2580000003 HC RX 258: Performed by: ANESTHESIOLOGY

## 2023-04-04 PROCEDURE — C1888 ENDOVAS NON-CARDIAC ABL CATH: HCPCS | Performed by: STUDENT IN AN ORGANIZED HEALTH CARE EDUCATION/TRAINING PROGRAM

## 2023-04-04 PROCEDURE — 3600000004 HC SURGERY LEVEL 4 BASE: Performed by: STUDENT IN AN ORGANIZED HEALTH CARE EDUCATION/TRAINING PROGRAM

## 2023-04-04 PROCEDURE — 7100000001 HC PACU RECOVERY - ADDTL 15 MIN: Performed by: STUDENT IN AN ORGANIZED HEALTH CARE EDUCATION/TRAINING PROGRAM

## 2023-04-04 PROCEDURE — 2500000003 HC RX 250 WO HCPCS

## 2023-04-04 PROCEDURE — 6370000000 HC RX 637 (ALT 250 FOR IP): Performed by: ANESTHESIOLOGY

## 2023-04-04 PROCEDURE — 3600000014 HC SURGERY LEVEL 4 ADDTL 15MIN: Performed by: STUDENT IN AN ORGANIZED HEALTH CARE EDUCATION/TRAINING PROGRAM

## 2023-04-04 PROCEDURE — 80048 BASIC METABOLIC PNL TOTAL CA: CPT

## 2023-04-04 PROCEDURE — C1769 GUIDE WIRE: HCPCS | Performed by: STUDENT IN AN ORGANIZED HEALTH CARE EDUCATION/TRAINING PROGRAM

## 2023-04-04 PROCEDURE — A4217 STERILE WATER/SALINE, 500 ML: HCPCS | Performed by: STUDENT IN AN ORGANIZED HEALTH CARE EDUCATION/TRAINING PROGRAM

## 2023-04-04 PROCEDURE — 2709999900 HC NON-CHARGEABLE SUPPLY: Performed by: STUDENT IN AN ORGANIZED HEALTH CARE EDUCATION/TRAINING PROGRAM

## 2023-04-04 PROCEDURE — 6360000002 HC RX W HCPCS

## 2023-04-04 PROCEDURE — 3700000000 HC ANESTHESIA ATTENDED CARE: Performed by: STUDENT IN AN ORGANIZED HEALTH CARE EDUCATION/TRAINING PROGRAM

## 2023-04-04 PROCEDURE — 36475 ENDOVENOUS RF 1ST VEIN: CPT | Performed by: STUDENT IN AN ORGANIZED HEALTH CARE EDUCATION/TRAINING PROGRAM

## 2023-04-04 PROCEDURE — 7100000011 HC PHASE II RECOVERY - ADDTL 15 MIN: Performed by: STUDENT IN AN ORGANIZED HEALTH CARE EDUCATION/TRAINING PROGRAM

## 2023-04-04 RX ORDER — OXYCODONE HYDROCHLORIDE 5 MG/1
5 TABLET ORAL PRN
Status: COMPLETED | OUTPATIENT
Start: 2023-04-04 | End: 2023-04-04

## 2023-04-04 RX ORDER — ONDANSETRON 2 MG/ML
4 INJECTION INTRAMUSCULAR; INTRAVENOUS
Status: DISCONTINUED | OUTPATIENT
Start: 2023-04-04 | End: 2023-04-04 | Stop reason: HOSPADM

## 2023-04-04 RX ORDER — OXYCODONE HYDROCHLORIDE 10 MG/1
10 TABLET ORAL PRN
Status: COMPLETED | OUTPATIENT
Start: 2023-04-04 | End: 2023-04-04

## 2023-04-04 RX ORDER — EPHEDRINE SULFATE/0.9% NACL/PF 50 MG/5 ML
SYRINGE (ML) INTRAVENOUS PRN
Status: DISCONTINUED | OUTPATIENT
Start: 2023-04-04 | End: 2023-04-04 | Stop reason: SDUPTHER

## 2023-04-04 RX ORDER — SODIUM CHLORIDE 0.9 % (FLUSH) 0.9 %
5-40 SYRINGE (ML) INJECTION EVERY 12 HOURS SCHEDULED
Status: DISCONTINUED | OUTPATIENT
Start: 2023-04-04 | End: 2023-04-04 | Stop reason: HOSPADM

## 2023-04-04 RX ORDER — DEXAMETHASONE SODIUM PHOSPHATE 4 MG/ML
INJECTION, SOLUTION INTRA-ARTICULAR; INTRALESIONAL; INTRAMUSCULAR; INTRAVENOUS; SOFT TISSUE PRN
Status: DISCONTINUED | OUTPATIENT
Start: 2023-04-04 | End: 2023-04-04 | Stop reason: SDUPTHER

## 2023-04-04 RX ORDER — SODIUM CHLORIDE 0.9 % (FLUSH) 0.9 %
5-40 SYRINGE (ML) INJECTION PRN
Status: DISCONTINUED | OUTPATIENT
Start: 2023-04-04 | End: 2023-04-04 | Stop reason: SDUPTHER

## 2023-04-04 RX ORDER — PROPOFOL 10 MG/ML
INJECTION, EMULSION INTRAVENOUS PRN
Status: DISCONTINUED | OUTPATIENT
Start: 2023-04-04 | End: 2023-04-04 | Stop reason: SDUPTHER

## 2023-04-04 RX ORDER — MAGNESIUM HYDROXIDE 1200 MG/15ML
LIQUID ORAL CONTINUOUS PRN
Status: COMPLETED | OUTPATIENT
Start: 2023-04-04 | End: 2023-04-04

## 2023-04-04 RX ORDER — ONDANSETRON 2 MG/ML
INJECTION INTRAMUSCULAR; INTRAVENOUS PRN
Status: DISCONTINUED | OUTPATIENT
Start: 2023-04-04 | End: 2023-04-04 | Stop reason: SDUPTHER

## 2023-04-04 RX ORDER — LIDOCAINE HYDROCHLORIDE 20 MG/ML
INJECTION, SOLUTION EPIDURAL; INFILTRATION; INTRACAUDAL; PERINEURAL PRN
Status: DISCONTINUED | OUTPATIENT
Start: 2023-04-04 | End: 2023-04-04 | Stop reason: SDUPTHER

## 2023-04-04 RX ORDER — SODIUM CHLORIDE 0.9 % (FLUSH) 0.9 %
5-40 SYRINGE (ML) INJECTION PRN
Status: DISCONTINUED | OUTPATIENT
Start: 2023-04-04 | End: 2023-04-04 | Stop reason: HOSPADM

## 2023-04-04 RX ORDER — FENTANYL CITRATE 50 UG/ML
INJECTION, SOLUTION INTRAMUSCULAR; INTRAVENOUS PRN
Status: DISCONTINUED | OUTPATIENT
Start: 2023-04-04 | End: 2023-04-04 | Stop reason: SDUPTHER

## 2023-04-04 RX ORDER — SODIUM CHLORIDE 9 MG/ML
INJECTION, SOLUTION INTRAVENOUS PRN
Status: DISCONTINUED | OUTPATIENT
Start: 2023-04-04 | End: 2023-04-04 | Stop reason: HOSPADM

## 2023-04-04 RX ORDER — PHENYLEPHRINE HCL IN 0.9% NACL 1 MG/10 ML
SYRINGE (ML) INTRAVENOUS PRN
Status: DISCONTINUED | OUTPATIENT
Start: 2023-04-04 | End: 2023-04-04 | Stop reason: SDUPTHER

## 2023-04-04 RX ORDER — SODIUM CHLORIDE 0.9 % (FLUSH) 0.9 %
5-40 SYRINGE (ML) INJECTION EVERY 12 HOURS SCHEDULED
Status: DISCONTINUED | OUTPATIENT
Start: 2023-04-04 | End: 2023-04-04 | Stop reason: SDUPTHER

## 2023-04-04 RX ORDER — SODIUM CHLORIDE 9 MG/ML
INJECTION, SOLUTION INTRAVENOUS PRN
Status: DISCONTINUED | OUTPATIENT
Start: 2023-04-04 | End: 2023-04-04 | Stop reason: SDUPTHER

## 2023-04-04 RX ORDER — ROCURONIUM BROMIDE 10 MG/ML
INJECTION, SOLUTION INTRAVENOUS PRN
Status: DISCONTINUED | OUTPATIENT
Start: 2023-04-04 | End: 2023-04-04 | Stop reason: SDUPTHER

## 2023-04-04 RX ORDER — OXYCODONE HYDROCHLORIDE 5 MG/1
5 TABLET ORAL EVERY 6 HOURS PRN
Qty: 20 TABLET | Refills: 0 | Status: SHIPPED | OUTPATIENT
Start: 2023-04-04 | End: 2023-04-09

## 2023-04-04 RX ORDER — MIDAZOLAM HYDROCHLORIDE 1 MG/ML
INJECTION INTRAMUSCULAR; INTRAVENOUS PRN
Status: DISCONTINUED | OUTPATIENT
Start: 2023-04-04 | End: 2023-04-04 | Stop reason: SDUPTHER

## 2023-04-04 RX ADMIN — Medication 200 MCG: at 08:15

## 2023-04-04 RX ADMIN — PROPOFOL 50 MG: 10 INJECTION, EMULSION INTRAVENOUS at 08:19

## 2023-04-04 RX ADMIN — Medication 200 MCG: at 08:32

## 2023-04-04 RX ADMIN — Medication 10 MG: at 08:00

## 2023-04-04 RX ADMIN — Medication 100 MCG: at 08:47

## 2023-04-04 RX ADMIN — FENTANYL CITRATE 100 MCG: 50 INJECTION INTRAMUSCULAR; INTRAVENOUS at 07:33

## 2023-04-04 RX ADMIN — ONDANSETRON 4 MG: 2 INJECTION INTRAMUSCULAR; INTRAVENOUS at 07:38

## 2023-04-04 RX ADMIN — Medication 200 MCG: at 07:40

## 2023-04-04 RX ADMIN — Medication 200 MCG: at 07:46

## 2023-04-04 RX ADMIN — Medication 100 MCG: at 08:27

## 2023-04-04 RX ADMIN — MIDAZOLAM 2 MG: 1 INJECTION INTRAMUSCULAR; INTRAVENOUS at 07:30

## 2023-04-04 RX ADMIN — Medication 200 MCG: at 07:48

## 2023-04-04 RX ADMIN — Medication 100 MCG: at 07:50

## 2023-04-04 RX ADMIN — SODIUM CHLORIDE: 9 INJECTION, SOLUTION INTRAVENOUS at 06:30

## 2023-04-04 RX ADMIN — Medication 200 MCG: at 07:37

## 2023-04-04 RX ADMIN — PROPOFOL 130 MG: 10 INJECTION, EMULSION INTRAVENOUS at 07:33

## 2023-04-04 RX ADMIN — Medication 200 MCG: at 08:39

## 2023-04-04 RX ADMIN — Medication 10 MG: at 07:58

## 2023-04-04 RX ADMIN — Medication 200 MCG: at 08:23

## 2023-04-04 RX ADMIN — OXYCODONE HYDROCHLORIDE 10 MG: 10 TABLET ORAL at 09:29

## 2023-04-04 RX ADMIN — Medication 10 MG: at 08:03

## 2023-04-04 RX ADMIN — Medication 100 MCG: at 07:43

## 2023-04-04 RX ADMIN — SUGAMMADEX 200 MG: 100 INJECTION, SOLUTION INTRAVENOUS at 08:49

## 2023-04-04 RX ADMIN — Medication 10 MG: at 08:09

## 2023-04-04 RX ADMIN — VANCOMYCIN HYDROCHLORIDE 1500 MG: 1.5 INJECTION, POWDER, LYOPHILIZED, FOR SOLUTION INTRAVENOUS at 06:45

## 2023-04-04 RX ADMIN — ROCURONIUM BROMIDE 50 MG: 10 INJECTION, SOLUTION INTRAVENOUS at 07:33

## 2023-04-04 RX ADMIN — Medication 10 MG: at 07:56

## 2023-04-04 RX ADMIN — ROCURONIUM BROMIDE 20 MG: 10 INJECTION, SOLUTION INTRAVENOUS at 08:19

## 2023-04-04 RX ADMIN — LIDOCAINE HYDROCHLORIDE 100 MG: 20 INJECTION, SOLUTION EPIDURAL; INFILTRATION; INTRACAUDAL; PERINEURAL at 07:33

## 2023-04-04 RX ADMIN — SODIUM CHLORIDE: 9 INJECTION, SOLUTION INTRAVENOUS at 08:47

## 2023-04-04 RX ADMIN — DEXAMETHASONE SODIUM PHOSPHATE 4 MG: 4 INJECTION, SOLUTION INTRAMUSCULAR; INTRAVENOUS at 07:38

## 2023-04-04 ASSESSMENT — PAIN DESCRIPTION - FREQUENCY: FREQUENCY: CONTINUOUS

## 2023-04-04 ASSESSMENT — PAIN - FUNCTIONAL ASSESSMENT
PAIN_FUNCTIONAL_ASSESSMENT: 0-10
PAIN_FUNCTIONAL_ASSESSMENT: PREVENTS OR INTERFERES WITH MANY ACTIVE NOT PASSIVE ACTIVITIES

## 2023-04-04 ASSESSMENT — PAIN SCALES - GENERAL
PAINLEVEL_OUTOF10: 5
PAINLEVEL_OUTOF10: 8

## 2023-04-04 ASSESSMENT — LIFESTYLE VARIABLES: SMOKING_STATUS: 0

## 2023-04-04 ASSESSMENT — PAIN DESCRIPTION - DESCRIPTORS: DESCRIPTORS: ACHING;SORE

## 2023-04-04 ASSESSMENT — PAIN DESCRIPTION - ORIENTATION: ORIENTATION: LEFT

## 2023-04-04 ASSESSMENT — PAIN DESCRIPTION - ONSET: ONSET: ON-GOING

## 2023-04-04 ASSESSMENT — PAIN DESCRIPTION - LOCATION: LOCATION: LEG;OTHER (COMMENT)

## 2023-04-04 ASSESSMENT — PAIN DESCRIPTION - PAIN TYPE: TYPE: SURGICAL PAIN

## 2023-04-04 NOTE — ANESTHESIA PRE PROCEDURE
[facility] Department of Anesthesiology  Pre-Anesthesia Evaluation/Consultation       Name:  Yamileth Palm  : 1970  Age:  50 y.o.                                            MRN:  [id number]  Date: 2023           Surgeon: Surgeon(s):  Harika Cruz DO    Procedure: Procedure(s):  RADIOFREQUENCY ABLATION OF LEFT GREATER SAPHENOUS VEIN WITH STAB PHLEBECTOMIES     Allergies   Allergen Reactions    Penicillins Hives     Patient Active Problem List   Diagnosis    Acute dermatitis    Varicose veins of right lower extremity with inflammation, with ulcer of ankle limited to breakdown of skin ([address]    Post-op pain     Past Medical History:   Diagnosis Date    Arthritis     Back pain     Headache     Migraine     Varicose vein of leg 2023    left     Past Surgical History:   Procedure Laterality Date     SECTION      ESOPHAGOGASTRODUODENOSCOPY      SAPHENOUS VEIN ABLATION Right 2023    RADIOFREQUENCY ABLATION OF GREATER SAPHENOUS VEIN OF RIGHT LOWER EXTREMITY WITH STAB PHLEBECTOMIES performed by Harika Cruz DO at . [facility] History     Tobacco Use    Smoking status: Never    Smokeless tobacco: Never   Vaping Use    Vaping Use: Never used   Substance Use Topics    Alcohol use: No    Drug use: No     Medications  Current Facility-Administered Medications on File Prior to Visit   Medication Dose Route Frequency Provider Last Rate Last Admin    vancomycin (VANCOCIN) 1,500 mg in sodium chloride 0.9 % 250 mL IVPB  1,500 mg IntraVENous On Call to [address] [place] [address] 166.7 mL/hr at 23 0645 1,500 mg at 23 0645    sodium chloride flush 0.9 % injection 5-40 mL  5-40 mL IntraVENous 2 times per day Shanta Ceballos MD        sodium chloride flush 0.9 % injection 5-40 mL  5-40 mL IntraVENous PRN Shanta Ceballos MD        0.9 % sodium chloride infusion   IntraVENous PRN Shanta Ceballos  mL/hr at 23 0630 New Bag at 23 0630     Current Outpatient

## 2023-04-04 NOTE — H&P
H&P Update    I have reviewed the history and physical and examined the patient and find no relevant changes. I have reviewed with the patient and/or family the risks, benefits, and alternatives to the procedure. I HAVE PRESENTED REASONABLE ALTERNATIVES TO THE PATIENT'S PROPOSED CARE, TREATMENT, AND SERVICES. THE DISCUSSION I HAVE DONE ENCOMPASSED RISKS, BENEFITS, AND SIDE EFFECTS RELATED TO THE ALTERNATIVES AND THE RISKS RELATED TO NOT RECEIVING THE PROPOSED CARE, TREATMENT, SERVICES. Patient:  Aman Zamudio   :   1970    Intended Procedure: left GSV ablation with phlebectomies     Vitals:    23 0619   BP: 122/78   Pulse: 79   Resp: 18   Temp: 97 °F (36.1 °C)   SpO2: 96%       Nursing notes reviewed and agreed. Medications reviewed  Allergies:    Allergies   Allergen Reactions    Penicillins Hives         Post Procedure plan: home    Yariel Reyes DO, FACS, FSVS, [address] [facility] End[facility]

## 2023-04-04 NOTE — DISCHARGE INSTRUCTIONS
Keep leg wrapped for 48 hours. Okay to use compression stockings after that. Ultrasound to be arranged by the office for follow up. Pain medication sent to pharmacy if needed; okay for tylenol and ibuprofen. Okay to remove wrap if saturated. Follow up in 2 weeks. Please call office with questions or concerns.

## 2023-04-04 NOTE — ANESTHESIA POSTPROCEDURE EVALUATION
Department of Anesthesiology  Postprocedure Note    Patient: Gaurang Pérez  MRN: [id number]  [place]: 1970  Date of evaluation: 4/4/2023      Procedure Summary     Date: 04/04/23 Room / Location: [address] [place] [address],[address] [place] [address] / [facility]    Anesthesia Start: 0730 Anesthesia Stop: 0901    Procedure: [address] WITH STAB PHLEBECTOMIES (Left: Leg Upper) Diagnosis:       Varicose veins of left lower extremity with pain      (varicose veins of left lower extremity with pain)    Surgeons: Becky Marie DO Responsible Provider: Mallory Trinidad MD    Anesthesia Type: General ASA Status: 3          Anesthesia Type: General    Joao Phase I: Joao Score: 8    Joao Phase II: Joao Score: 10      Anesthesia Post Evaluation    Patient location during evaluation: PACU  Level of consciousness: awake and alert  Airway patency: patent  Nausea & Vomiting: no nausea and no vomiting  Complications: no  Cardiovascular status: blood pressure returned to baseline  Respiratory status: acceptable  Hydration status: euvolemic  Comments: Postoperative Anesthesia Note    Name:    Gaurang Pérez  MRN:      [id number]    Patient Vitals in the past 12 hrs:  04/04/23 0954, BP:106/63, Pulse:85, Resp:20, SpO2:100 %  04/04/23 0929, Resp:20, SpO2:94 %  04/04/23 0919, BP:92/66, Temp:97.4 °F (36.3 °C), Temp src:Temporal, Pulse:85, Resp:20  04/04/23 0915, BP:93/69, Pulse:87, Resp:15, SpO2:97 %  04/04/23 0912, BP:93/69, Pulse:81, Resp:13, SpO2:96 %  04/04/23 0906, BP:101/85, Pulse:88, Resp:25, SpO2:94 %  04/04/23 0904, Pulse:82, Resp:20, SpO2:99 %  04/04/23 0900, BP:95/64, Pulse:85, Resp:18, SpO2:99 %  04/04/23 0857, BP:104/70, Temp:(!) 96.6 °F (35.9 °C), Temp src:Temporal, Pulse:88, Resp:20, SpO2:98 %  04/04/23 0619, BP:122/78, Temp:97 °F (36.1 °C), Temp src:Temporal, Pulse:79, Resp:18, SpO2:96 %, Height:5' 1\" (1.549 m), Weight:241 lb 15.3 oz (109.8 kg)     LABS:    CBC  Lab

## 2023-04-04 NOTE — BRIEF OP NOTE
Brief Postoperative Note      Patient: Lam Pace  YOB: 1970  MRN: [id number]    Date of Procedure: 4/4/2023    Pre-Op Diagnosis: varicose veins of left lower extremity with pain    Post-Op Diagnosis: Same       Procedure(s):  RADIOFREQUENCY ABLATION OF LEFT GREATER SAPHENOUS VEIN WITH STAB PHLEBECTOMIES    Surgeon(s):  Prabhakar Larry DO    Assistant:  Surgical Assistant: Shaheen Lyons    Anesthesia: General    Estimated Blood Loss (mL): less than 803     Complications: None    Specimens:   * No specimens in log *    Implants:  * No implants in log *      Drains: * No LDAs found *    Findings: 30 phlebectomies    Electronically signed by Prabhakar Larry DO on 4/4/2023 at 8:56 AM

## 2023-04-04 NOTE — PROGRESS NOTES
Pt arrived to PACU from OR. Pt asleep on 4l/nc, awakens easily. Left leg dressing with small amount bloody drainage noted. Pt denies c/o pain at this time.

## 2023-04-05 NOTE — OP NOTE
by mapping of the  saphenous vein. It became extrafascial above the knee. We therefore  mapped out its course and then accessed it above the knee. We did this  percutaneously and placed a 7-Malagasy sheath. After this, we placed a 7  x 60 cm ablation catheter up to the saphenofemoral junction, withdrew it  3 cm distal to the actual saphenofemoral junction. We then placed  tumescent fluid around the entire length of the catheter all the way up  to the saphenofemoral junction. After this, we performed serial  ablations. We did two proximally and then distally we performed  ablations down to the level of the sheath. After that we removed the  sheath and performed phlebectomies. She had very large and distended varicose  veins. We were able to get some good pieces out through 30 separate  phlebectomy sites. All those wounds were then irrigated with saline. The patient's leg was then dressed with benzoin, Steri-Strips, fluffs,  Kerlix, and Coban wrap x2. She was extubated and taken to the recovery  area in stable condition. I was present and performed all critical  aspects of this procedure. There were no immediate complications.         Bryce Geronimo DO    D: 04/04/2023 9:25:08       T: 04/04/2023 10:54:22     [email or web address]  Job#: [id number]     Doc#: [id number]    CC:

## 2023-04-11 DIAGNOSIS — G89.18 POST-OP PAIN: ICD-10-CM

## 2023-04-12 RX ORDER — OXYCODONE HYDROCHLORIDE 5 MG/1
5 TABLET ORAL EVERY 8 HOURS PRN
Qty: 20 TABLET | Refills: 0 | Status: SHIPPED | OUTPATIENT
Start: 2023-04-12 | End: 2023-04-19

## 2023-04-20 ENCOUNTER — PROCEDURE VISIT (OUTPATIENT)
Dept: SURGERY | Age: 53
End: 2023-04-20

## 2023-04-20 DIAGNOSIS — I83.812 VARICOSE VEINS OF LEFT LOWER EXTREMITY WITH PAIN: Primary | ICD-10-CM

## 2023-04-27 ENCOUNTER — OFFICE VISIT (OUTPATIENT)
Dept: VASCULAR SURGERY | Age: 53
End: 2023-04-27

## 2023-04-27 ENCOUNTER — OFFICE VISIT (OUTPATIENT)
Dept: ENT CLINIC | Age: 53
End: 2023-04-27
Payer: MEDICAID

## 2023-04-27 VITALS — BODY MASS INDEX: 45.31 KG/M2 | OXYGEN SATURATION: 99 % | HEIGHT: 61 IN | WEIGHT: 240 LBS

## 2023-04-27 VITALS — BODY MASS INDEX: 45.72 KG/M2 | HEIGHT: 61 IN

## 2023-04-27 DIAGNOSIS — K21.9 LARYNGOPHARYNGEAL REFLUX (LPR): ICD-10-CM

## 2023-04-27 DIAGNOSIS — R13.14 PHARYNGOESOPHAGEAL DYSPHAGIA: Primary | ICD-10-CM

## 2023-04-27 DIAGNOSIS — K11.8 PAROTID MASS: ICD-10-CM

## 2023-04-27 DIAGNOSIS — Z09 POSTOP CHECK: Primary | ICD-10-CM

## 2023-04-27 PROCEDURE — G8427 DOCREV CUR MEDS BY ELIG CLIN: HCPCS | Performed by: OTOLARYNGOLOGY

## 2023-04-27 PROCEDURE — G8417 CALC BMI ABV UP PARAM F/U: HCPCS | Performed by: OTOLARYNGOLOGY

## 2023-04-27 PROCEDURE — 3017F COLORECTAL CA SCREEN DOC REV: CPT | Performed by: OTOLARYNGOLOGY

## 2023-04-27 PROCEDURE — 31575 DIAGNOSTIC LARYNGOSCOPY: CPT | Performed by: OTOLARYNGOLOGY

## 2023-04-27 PROCEDURE — 99214 OFFICE O/P EST MOD 30 MIN: CPT | Performed by: OTOLARYNGOLOGY

## 2023-04-27 PROCEDURE — 1036F TOBACCO NON-USER: CPT | Performed by: OTOLARYNGOLOGY

## 2023-04-27 PROCEDURE — 99024 POSTOP FOLLOW-UP VISIT: CPT | Performed by: STUDENT IN AN ORGANIZED HEALTH CARE EDUCATION/TRAINING PROGRAM

## 2023-04-27 RX ORDER — PANTOPRAZOLE SODIUM 40 MG/1
40 TABLET, DELAYED RELEASE ORAL
Qty: 90 TABLET | Refills: 1 | Status: SHIPPED | OUTPATIENT
Start: 2023-04-27

## 2023-04-27 NOTE — PROGRESS NOTES
Vaibhav Knox (:  1970) is a 50 y.o. female,Established patient, here for evaluation of the following chief complaint(s):  Post-Op Check         ASSESSMENT/PLAN:  1. Postop check      This is a 82-year-old female patient status post left lower extremity ablation. She also had stab phlebectomies. She still has some residual varicose veins but overall her symptoms are improved. Continue with compression therapy. Recommend follow-up if symptoms recur or new symptoms appear. All questions answered. Anticipate healing of the subcutaneous collections over a period of time. Subjective   SUBJECTIVE/OBJECTIVE:  This is a 82-year-old female patient who presents as a postop for treatment of left lower extremity varicose veins. She underwent an ablation with stab phlebectomies. Overall she has improvement. No DVT on follow-up scan. She still has some varicose veins that appear to be dilated on the lateral aspect of her knee but the medial ones. Have resolved quite nicely. She has some fullness in the area of the ablation which is not erythematous and only mildly tender which likely represents residual subcutaneous hematoma and scar tissue. No significant swelling. Some minor burning she states and tingling around her ankle. Objective   Physical Exam  Constitutional:       Appearance: She is obese. Cardiovascular:      Rate and Rhythm: Normal rate and regular rhythm. Pulmonary:      Effort: Pulmonary effort is normal. No respiratory distress. Skin:     General: Skin is warm and dry. Comments: Mostly resolved clusters of varicose veins. Small cluster on the lateral aspect of the knee. Palpable fullness in the upper medial thigh adjacent to a phlebectomy site consistent with resolving subcutaneous hematoma. Overall improved look to the skin integrity. Neurological:      Mental Status: She is alert.         Meredith Haynes, , FACS, FSVS, [facility] and

## 2023-04-27 NOTE — PROGRESS NOTES
[address] SURGERY  Follow up      Patient Name: [id number] Record Number:  [id number]  Primary Care Physician:  Arias Green [facility]  Date of Consultation: 4/27/2023    Chief Complaint: Swallowing issues\        Interval History    The patient presents mostly for swallowing issues. She has a long history of feeling like things get stuck. She says sometimes she has the cough it back out. She said that she had her throat stretched a couple of times a while ago at an outside hospital, but it did not help. She is not smoking. She denies coughing up blood, weight loss or other symptoms. Has not affected her voice. She said that she has taken reflux medication in the past, but does not think she has heartburn. She does clear her throat a lot. I had been seeing her for a parotid mass. We got an FNA that really was not diagnostic. She thinks its actually gotten better rather than larger. REVIEW OF SYSTEMS  As above    PHYSICAL EXAM  GENERAL: No Acute Distress, Alert and Oriented, no Hoarseness, strong voice  EYES: EOMI, Anti-icteric  HENT:   Head: Normocephalic and atraumatic. Face:  Symmetric, facial nerve intact, no sinus tenderness  Right Ear: Normal external ear, normal external auditory canal, intact tympanic membrane with normal mobility and aerated middle ear  Left Ear: Normal external ear, normal external auditory canal, intact tympanic membrane with normal mobility and aerated middle ear  Mouth/Oral Cavity:  normal lips, Uvula is midline, no mucosal lesion  Oropharynx/Larynx:  normal oropharynx,  Nose:Normal external nasal appearance. NECK: Normal range of motion, no thyromegaly, trachea is midline, no lymphadenopathy, no neck masses, no crepitus              REVIEW OF MEDICAL RECORDS  I reviewed her medical records. She was seeing a gastroenterologist at an outside hospital around 2020.   It looks like she was diagnosed with a

## 2023-05-05 ENCOUNTER — HOSPITAL ENCOUNTER (OUTPATIENT)
Dept: GENERAL RADIOLOGY | Age: 53
Discharge: HOME | End: 2023-05-05
Payer: MEDICAID

## 2023-05-05 DIAGNOSIS — R13.14 PHARYNGOESOPHAGEAL DYSPHAGIA: ICD-10-CM

## 2023-05-05 PROCEDURE — 74220 X-RAY XM ESOPHAGUS 1CNTRST: CPT

## 2023-05-08 ENCOUNTER — PROCEDURE VISIT (OUTPATIENT)
Dept: SPEECH THERAPY | Age: 53
End: 2023-05-08
Payer: MEDICAID

## 2023-05-08 ENCOUNTER — OFFICE VISIT (OUTPATIENT)
Dept: ENT CLINIC | Age: 53
End: 2023-05-08
Payer: MEDICAID

## 2023-05-08 DIAGNOSIS — K22.2 ESOPHAGEAL STRICTURE: ICD-10-CM

## 2023-05-08 DIAGNOSIS — R13.19 ESOPHAGEAL DYSPHAGIA: Primary | ICD-10-CM

## 2023-05-08 DIAGNOSIS — R13.14 PHARYNGOESOPHAGEAL DYSPHAGIA: Primary | ICD-10-CM

## 2023-05-08 DIAGNOSIS — K21.9 GASTROESOPHAGEAL REFLUX DISEASE, UNSPECIFIED WHETHER ESOPHAGITIS PRESENT: ICD-10-CM

## 2023-05-08 PROCEDURE — G8428 CUR MEDS NOT DOCUMENT: HCPCS | Performed by: OTOLARYNGOLOGY

## 2023-05-08 PROCEDURE — 3017F COLORECTAL CA SCREEN DOC REV: CPT | Performed by: OTOLARYNGOLOGY

## 2023-05-08 PROCEDURE — 92526 ORAL FUNCTION THERAPY: CPT | Performed by: SPEECH-LANGUAGE PATHOLOGIST

## 2023-05-08 PROCEDURE — 92612 ENDOSCOPY SWALLOW (FEES) VID: CPT | Performed by: SPEECH-LANGUAGE PATHOLOGIST

## 2023-05-08 PROCEDURE — 1036F TOBACCO NON-USER: CPT | Performed by: OTOLARYNGOLOGY

## 2023-05-08 PROCEDURE — G8417 CALC BMI ABV UP PARAM F/U: HCPCS | Performed by: OTOLARYNGOLOGY

## 2023-05-08 PROCEDURE — 99213 OFFICE O/P EST LOW 20 MIN: CPT | Performed by: OTOLARYNGOLOGY

## 2023-05-08 NOTE — PROGRESS NOTES
[address]- HEAD & NECK SURGERY  Follow up      Patient Name: [id number] Record Number:  [id number]  Primary Care Physician:  [facility]  Date of Consultation: 5/8/2023    Chief Complaint: Dysphagia        Interval History    Patient is following up for her dysphagia. She had an esophagram.  She saw our speech therapist today. She still having trouble swallowing. REVIEW OF SYSTEMS  As above    PHYSICAL EXAM  GENERAL: No Acute Distress, Alert and Oriented, no Hoarseness, strong voice  EYES: EOMI, Anti-icteric  HENT:   Head: Normocephalic and atraumatic. Face:  Symmetric, facial nerve intact, no sinus tenderness  Right Ear: Normal external ear  Left Ear: Normal external ear,  Mouth/Oral Cavity:  normal lips, Uvula is midline, no mucosal lesions  Oropharynx/Larynx:  normal oropharynx,   Nose:Normal external nasal appearance. NECK: Normal range of motion, no thyromegaly, trachea is midline, no lymphadenopathy, no neck masses, no crepitus          RADIOLOGY  Summary of findings:  I reviewed the patient's esophagram.  She has a distal esophageal stricture. REVIEW OF MEDICAL RECORDS  I reviewed with our speech therapist Elkin Sparks today. I agree that the neck step for her is gastroenterology referral for the esophageal stricture. ASSESSMENT/PLAN  1. Esophageal dysphagia  Her primary problem is the esophageal stricture. I would have her see gastroenterology. She will follow-up with me if they find anything that I can help with.  - DAVINA Aragon MD, Gastroenterology (ERCP), [facility]    2. Esophageal stricture  As above  - DAVINA Aragon MD, Gastroenterology (ERCP), [facility]             I have performed a head and neck physical exam personally or was physically present during the key or critical portions of the service.     This note was generated completely or in part utilizing Dragon dictation speech

## 2023-05-08 NOTE — PROGRESS NOTES
[facility] Ear, Nose, and Throat  SPEECH THERAPY  Fiberoptic Endoscopic Evaluation of Swallowing  (FEES)/Treatment      Name: Padma Wallace  YOB: 1970  Gender: female  MRN: [id number]  Referring Physician: Dr. Colleen Miranda  Diagnosis: Pharyngoesophageal dysphagia  Onset Date: ~2018  History of Present Illness/Injury:    Patient Active Problem List    Diagnosis Date Noted    Post-op pain 01/13/2023    Varicose veins of right lower extremity with inflammation, with ulcer of ankle limited to breakdown of skin ([address] 09/29/2022    Acute dermatitis 06/23/2022    Varicose veins of left lower extremity with pain 04/04/2023     Date of Exam: 5/8/2023    Recent Esophagram (5/5/23)-  IMPRESSION:  Moderate dysmotility  Fixed moderate stenosis of the distal esophagus with no visualized mass. Previous SLP Evaluations- NA    Patient Complaints/Reason for Referral:  Padma Wallace was referred for a FEES to assess the efficiency of his/her swallow function, assess for aspiration, and to make recommendations regarding safe dietary consistencies, effective compensatory strategies, and safe eating environment. BACKGROUND HISTORY:   Pt w/ hx of dysphagia; initial onset ~2018 and had x2 EGD w/ dilation that resolved symptoms. Last seen at [facility] 2/17/20. Over the last few months, reported gradual worsening of symptoms w/ sensation of food \"sticking\" which requires expectoration/regurgitation. Occurs most w/ solids vs liquids. Denied sensation of coughing or choking. Denied dyspnea or dysphonia. Hx of GERD and currently taking Pantoprazole 40 mg daily. Recent esophagram (5/5/23) did reveal moderate dysmotility and stenosis of distal esophagus resulting in obstruction w/ barium tablet. Referred by ENT to ensure no oropharyngeal component.      IMPRESSIONS:   Pt presents w/ grossly functional oropharyngeal swallow characterized by timely swallow initiation, adequate airway protection, and functional clearance of all

## 2023-06-30 ENCOUNTER — HOSPITAL ENCOUNTER (OUTPATIENT)
Dept: GENERAL RADIOLOGY | Age: 53
Discharge: HOME | End: 2023-06-30
Payer: MEDICAID

## 2023-06-30 ENCOUNTER — HOSPITAL ENCOUNTER (OUTPATIENT)
Age: 53
Discharge: HOME | End: 2023-06-30
Payer: MEDICAID

## 2023-06-30 DIAGNOSIS — R05.9 COUGH, UNSPECIFIED TYPE: ICD-10-CM

## 2023-06-30 PROCEDURE — 71046 X-RAY EXAM CHEST 2 VIEWS: CPT

## 2024-01-03 ENCOUNTER — HOSPITAL ENCOUNTER (OUTPATIENT)
Dept: WOMENS IMAGING | Age: 54
Discharge: HOME | End: 2024-01-03
Payer: MEDICAID

## 2024-01-03 DIAGNOSIS — Z12.31 SCREENING MAMMOGRAM FOR BREAST CANCER: ICD-10-CM

## 2024-01-03 PROCEDURE — 77067 SCR MAMMO BI INCL CAD: CPT

## 2024-09-24 ENCOUNTER — HOSPITAL ENCOUNTER (OUTPATIENT)
Dept: WOUND CARE | Age: 54
Discharge: HOME | End: 2024-09-24
Attending: PODIATRIST
Payer: MEDICAID

## 2024-09-24 VITALS
WEIGHT: 223.99 LBS | BODY MASS INDEX: 42.29 KG/M2 | RESPIRATION RATE: 18 BRPM | HEART RATE: 79 BPM | HEIGHT: 61 IN | TEMPERATURE: 97.7 F | SYSTOLIC BLOOD PRESSURE: 123 MMHG | DIASTOLIC BLOOD PRESSURE: 80 MMHG

## 2024-09-24 DIAGNOSIS — L97.311 VARICOSE VEINS OF RIGHT LOWER EXTREMITY WITH INFLAMMATION, WITH ULCER OF ANKLE LIMITED TO BREAKDOWN OF SKIN (HCC): ICD-10-CM

## 2024-09-24 DIAGNOSIS — I83.213 VARICOSE VEINS OF RIGHT LOWER EXTREMITY WITH INFLAMMATION, WITH ULCER OF ANKLE LIMITED TO BREAKDOWN OF SKIN (HCC): ICD-10-CM

## 2024-09-24 DIAGNOSIS — L30.9 ACUTE DERMATITIS: Primary | ICD-10-CM

## 2024-09-24 PROCEDURE — 99213 OFFICE O/P EST LOW 20 MIN: CPT

## 2024-09-24 RX ORDER — LIDOCAINE 50 MG/G
OINTMENT TOPICAL ONCE
OUTPATIENT
Start: 2024-09-24 | End: 2024-09-24

## 2024-09-24 RX ORDER — BETAMETHASONE VALERATE 1.2 MG/G
CREAM TOPICAL
Qty: 1 EACH | Refills: 3 | Status: SHIPPED | OUTPATIENT
Start: 2024-09-24 | End: 2024-10-01

## 2024-09-24 RX ORDER — TRIAMCINOLONE ACETONIDE 1 MG/G
OINTMENT TOPICAL ONCE
OUTPATIENT
Start: 2024-09-24 | End: 2024-09-24

## 2024-09-24 RX ORDER — GINSENG 100 MG
CAPSULE ORAL ONCE
OUTPATIENT
Start: 2024-09-24 | End: 2024-09-24

## 2024-09-24 RX ORDER — LIDOCAINE 40 MG/G
CREAM TOPICAL ONCE
OUTPATIENT
Start: 2024-09-24 | End: 2024-09-24

## 2024-09-24 RX ORDER — MUPIROCIN 20 MG/G
OINTMENT TOPICAL ONCE
OUTPATIENT
Start: 2024-09-24 | End: 2024-09-24

## 2024-09-24 RX ORDER — BETAMETHASONE DIPROPIONATE 0.5 MG/G
CREAM TOPICAL ONCE
OUTPATIENT
Start: 2024-09-24 | End: 2024-09-24

## 2024-09-24 RX ORDER — LIDOCAINE HYDROCHLORIDE 40 MG/ML
SOLUTION TOPICAL ONCE
OUTPATIENT
Start: 2024-09-24 | End: 2024-09-24

## 2024-09-24 RX ORDER — GENTAMICIN SULFATE 1 MG/G
OINTMENT TOPICAL ONCE
OUTPATIENT
Start: 2024-09-24 | End: 2024-09-24

## 2024-09-24 RX ORDER — BACITRACIN ZINC AND POLYMYXIN B SULFATE 500; 1000 [USP'U]/G; [USP'U]/G
OINTMENT TOPICAL ONCE
OUTPATIENT
Start: 2024-09-24 | End: 2024-09-24

## 2024-09-24 RX ORDER — SODIUM CHLOR/HYPOCHLOROUS ACID 0.033 %
SOLUTION, IRRIGATION IRRIGATION ONCE
OUTPATIENT
Start: 2024-09-24 | End: 2024-09-24

## 2024-09-24 RX ORDER — SILVER SULFADIAZINE 10 MG/G
CREAM TOPICAL ONCE
OUTPATIENT
Start: 2024-09-24 | End: 2024-09-24

## 2024-09-24 RX ORDER — NEOMYCIN/BACITRACIN/POLYMYXINB 3.5-400-5K
OINTMENT (GRAM) TOPICAL ONCE
OUTPATIENT
Start: 2024-09-24 | End: 2024-09-24

## 2024-09-24 RX ORDER — LIDOCAINE HYDROCHLORIDE 20 MG/ML
JELLY TOPICAL ONCE
OUTPATIENT
Start: 2024-09-24 | End: 2024-09-24

## 2024-09-24 RX ORDER — CLOBETASOL PROPIONATE 0.5 MG/G
OINTMENT TOPICAL ONCE
OUTPATIENT
Start: 2024-09-24 | End: 2024-09-24

## 2024-09-24 ASSESSMENT — PAIN SCALES - GENERAL: PAINLEVEL_OUTOF10: 0

## 2024-10-08 ENCOUNTER — HOSPITAL ENCOUNTER (OUTPATIENT)
Dept: WOUND CARE | Age: 54
End: 2024-10-08
Attending: PODIATRIST
Payer: MEDICAID

## 2024-10-15 ENCOUNTER — HOSPITAL ENCOUNTER (OUTPATIENT)
Dept: WOUND CARE | Age: 54
End: 2024-10-15
Attending: PODIATRIST
Payer: MEDICAID

## 2024-10-22 ENCOUNTER — HOSPITAL ENCOUNTER (OUTPATIENT)
Dept: WOUND CARE | Age: 54
Discharge: HOME | End: 2024-10-22
Attending: PODIATRIST
Payer: MEDICAID

## 2024-10-22 VITALS
HEART RATE: 108 BPM | TEMPERATURE: 98.2 F | DIASTOLIC BLOOD PRESSURE: 56 MMHG | RESPIRATION RATE: 18 BRPM | SYSTOLIC BLOOD PRESSURE: 110 MMHG

## 2024-10-22 DIAGNOSIS — L97.311 VARICOSE VEINS OF RIGHT LOWER EXTREMITY WITH INFLAMMATION, WITH ULCER OF ANKLE LIMITED TO BREAKDOWN OF SKIN (HCC): ICD-10-CM

## 2024-10-22 DIAGNOSIS — L30.9 ACUTE DERMATITIS: Primary | ICD-10-CM

## 2024-10-22 DIAGNOSIS — I83.213 VARICOSE VEINS OF RIGHT LOWER EXTREMITY WITH INFLAMMATION, WITH ULCER OF ANKLE LIMITED TO BREAKDOWN OF SKIN (HCC): ICD-10-CM

## 2024-10-22 PROCEDURE — 97597 DBRDMT OPN WND 1ST 20 CM/<: CPT

## 2024-10-22 PROCEDURE — 99212 OFFICE O/P EST SF 10 MIN: CPT

## 2024-10-22 RX ORDER — TRIAMCINOLONE ACETONIDE 1 MG/G
OINTMENT TOPICAL ONCE
OUTPATIENT
Start: 2024-10-22 | End: 2024-10-22

## 2024-10-22 RX ORDER — BETAMETHASONE DIPROPIONATE 0.5 MG/G
CREAM TOPICAL ONCE
OUTPATIENT
Start: 2024-10-22 | End: 2024-10-22

## 2024-10-22 RX ORDER — LIDOCAINE 50 MG/G
OINTMENT TOPICAL ONCE
OUTPATIENT
Start: 2024-10-22 | End: 2024-10-22

## 2024-10-22 RX ORDER — LIDOCAINE HYDROCHLORIDE 40 MG/ML
SOLUTION TOPICAL ONCE
OUTPATIENT
Start: 2024-10-22 | End: 2024-10-22

## 2024-10-22 RX ORDER — BACITRACIN ZINC AND POLYMYXIN B SULFATE 500; 1000 [USP'U]/G; [USP'U]/G
OINTMENT TOPICAL ONCE
OUTPATIENT
Start: 2024-10-22 | End: 2024-10-22

## 2024-10-22 RX ORDER — LIDOCAINE 40 MG/G
CREAM TOPICAL ONCE
OUTPATIENT
Start: 2024-10-22 | End: 2024-10-22

## 2024-10-22 RX ORDER — LIDOCAINE HYDROCHLORIDE 20 MG/ML
JELLY TOPICAL ONCE
OUTPATIENT
Start: 2024-10-22 | End: 2024-10-22

## 2024-10-22 RX ORDER — NEOMYCIN/BACITRACIN/POLYMYXINB 3.5-400-5K
OINTMENT (GRAM) TOPICAL ONCE
OUTPATIENT
Start: 2024-10-22 | End: 2024-10-22

## 2024-10-22 RX ORDER — SODIUM CHLOR/HYPOCHLOROUS ACID 0.033 %
SOLUTION, IRRIGATION IRRIGATION ONCE
OUTPATIENT
Start: 2024-10-22 | End: 2024-10-22

## 2024-10-22 RX ORDER — LIDOCAINE HYDROCHLORIDE 40 MG/ML
SOLUTION TOPICAL ONCE
Status: COMPLETED | OUTPATIENT
Start: 2024-10-22 | End: 2024-10-22

## 2024-10-22 RX ORDER — CLOBETASOL PROPIONATE 0.5 MG/G
OINTMENT TOPICAL ONCE
OUTPATIENT
Start: 2024-10-22 | End: 2024-10-22

## 2024-10-22 RX ORDER — SILVER SULFADIAZINE 10 MG/G
CREAM TOPICAL ONCE
OUTPATIENT
Start: 2024-10-22 | End: 2024-10-22

## 2024-10-22 RX ORDER — MUPIROCIN 20 MG/G
OINTMENT TOPICAL ONCE
OUTPATIENT
Start: 2024-10-22 | End: 2024-10-22

## 2024-10-22 RX ORDER — GENTAMICIN SULFATE 1 MG/G
OINTMENT TOPICAL ONCE
OUTPATIENT
Start: 2024-10-22 | End: 2024-10-22

## 2024-10-22 RX ORDER — GINSENG 100 MG
CAPSULE ORAL ONCE
OUTPATIENT
Start: 2024-10-22 | End: 2024-10-22

## 2024-10-22 RX ADMIN — LIDOCAINE HYDROCHLORIDE: 40 SOLUTION TOPICAL at 15:34

## 2024-10-22 ASSESSMENT — PAIN SCALES - GENERAL: PAINLEVEL_OUTOF10: 0

## 2024-10-22 NOTE — PATIENT INSTRUCTIONS
[facility] Wound Care Physician Orders and Discharge Instructions  [facility]  [address], [address]  [place], [place] [address]  Telephone: [phone]      FAX [phone]  MONDAY - THURSDAY 8:00 am - 4:30 pm and Friday 8:00 am - 12:00 pm.        NAME:  Shona Disla  YOB: 1970  MEDICAL RECORD NUMBER:  [id number]  DATE:  10/22/2024      Return Appointment:  [x] Return Appointment: With Dr. Audie Thrasher  in  1 Month  [] Wound and dressing supply provider:   [] ECF or Home Healthcare:  [] Wound Assessment: [] Physician or NP scheduled for Wound Assessment:   [] Orders placed during your visit:       Important Reminders:   Please wash hands with soap and water before and after every dressing change.  Do not scrub wounds.  Keep wounds dry in shower unless otherwise instructed by the physician.  SMOKING can slow would healing. Stop smoking as soon as possible to improve healing and prevent further complications associated with smoking.      Sarah-Wound Topical Treatments:  Do not apply lotions, creams, or ointments to wound bed unless directed.   [] Apply moisturizing lotion to skin surrounding the wound prior to dressing change.  [] Apply antifungal ointment to skin surrounding the wound prior to dressing change.  [] Apply thin film of no sting moisture barrier ointment to skin immediately around      wound.  [] Other:       Wound Location: Right Anterior Ankle    Wound Cleansing: None    Primary Dressing:  [x] Betamethasone Cream  []       Dressing Frequency:  [x] Daily           Pressure Relief and Off Loading:  [] Off-loading when [] walking  [] in bed [] sitting   Turn every 2 hours when in bed   Avoid putting direct pressure on the site of the wound. Limit side lying to 30 degree tilt. Limit elevating the head of the bed greater than 30 degrees.                                      [] Assistive Devices     Use as instructed by the provider      Activity:

## 2024-10-22 NOTE — PROGRESS NOTES
ankle limited to breakdown of skin (HCC)    Relevant Orders    Initiate Outpatient Wound Care Protocol       Other    Acute dermatitis - Primary    Relevant Orders    Initiate Outpatient Wound Care Protocol        Procedure Note  Indications:  Based on my examination of this patient's wound(s)/ulcer(s) today, debridement is required to promote healing and evaluate the wound base.    Performed by: Audie Thrasher DPM    Consent obtained:  Yes    Time out taken:  Yes    Pain Control: Anesthetic  Anesthetic: 4% Lidocaine Liquid Topical       Debridement: Selective Debridement/Non-Excisional Debridement    Using #15 blade scalpel the wound(s)/ulcer(s) was/were debrided down through and including the removal of epidermis.        Devitalized Tissue Debrided:  callus    Pre Debridement Measurements:  Are located in the Wound/Ulcer Documentation Flow Sheet    Diabetic/Pressure/Non Pressure Ulcers only:  Ulcer: Non-Pressure ulcer, limited to breakdown of skin     Wound/Ulcer #: 1    Post Debridement Measurements:  Wound/Ulcer Descriptions are Pre Debridement except measurements:    Wound 09/29/22 Ankle Anterior;Right Wound #1; re-opened 9/24/24 (Active)   Wound Image   10/22/24 1528   Wound Etiology Other 09/24/24 1452   Dressing Status Other (Comment) 09/24/24 1452   Wound Cleansed Cleansed with saline 10/13/22 1045   Dressing/Treatment Pharmaceutical agent (see MAR);Gauze dressing/dressing sponge;Other (comment) 09/24/24 1530   Wound Length (cm) 0.3 cm 10/22/24 1528   Wound Width (cm) 0.6 cm 10/22/24 1528   Wound Depth (cm) 0.1 cm 10/22/24 1528   Wound Surface Area (cm^2) 0.18 cm^2 10/22/24 1528   Change in Wound Size % (l*w) 99.88 10/22/24 1528   Wound Volume (cm^3) 0.018 cm^3 10/22/24 1528   Wound Healing % 100 10/22/24 1528   Post-Procedure Length (cm) 0.5 cm 10/22/24 1550   Post-Procedure Width (cm) 0.8 cm 10/22/24 1550   Post-Procedure Depth (cm) 0.1 cm 10/22/24 1550   Post-Procedure Surface Area (cm^2) 0.4 cm^2

## 2024-11-19 ENCOUNTER — HOSPITAL ENCOUNTER (OUTPATIENT)
Dept: WOUND CARE | Age: 54
Discharge: HOME | End: 2024-11-19
Attending: PODIATRIST
Payer: MEDICAID

## 2024-11-19 VITALS
TEMPERATURE: 98.4 F | HEART RATE: 70 BPM | DIASTOLIC BLOOD PRESSURE: 77 MMHG | SYSTOLIC BLOOD PRESSURE: 140 MMHG | RESPIRATION RATE: 20 BRPM

## 2024-11-19 DIAGNOSIS — L30.9 ACUTE DERMATITIS: Primary | ICD-10-CM

## 2024-11-19 DIAGNOSIS — L97.311 VARICOSE VEINS OF RIGHT LOWER EXTREMITY WITH INFLAMMATION, WITH ULCER OF ANKLE LIMITED TO BREAKDOWN OF SKIN (HCC): ICD-10-CM

## 2024-11-19 DIAGNOSIS — I83.213 VARICOSE VEINS OF RIGHT LOWER EXTREMITY WITH INFLAMMATION, WITH ULCER OF ANKLE LIMITED TO BREAKDOWN OF SKIN (HCC): ICD-10-CM

## 2024-11-19 PROCEDURE — 99211 OFF/OP EST MAY X REQ PHY/QHP: CPT

## 2024-11-19 RX ORDER — LIDOCAINE HYDROCHLORIDE 40 MG/ML
SOLUTION TOPICAL ONCE
Status: CANCELLED | OUTPATIENT
Start: 2024-11-19 | End: 2024-11-19

## 2024-11-19 RX ORDER — SILVER SULFADIAZINE 10 MG/G
CREAM TOPICAL ONCE
Status: CANCELLED | OUTPATIENT
Start: 2024-11-19 | End: 2024-11-19

## 2024-11-19 RX ORDER — SODIUM CHLOR/HYPOCHLOROUS ACID 0.033 %
SOLUTION, IRRIGATION IRRIGATION ONCE
Status: CANCELLED | OUTPATIENT
Start: 2024-11-19 | End: 2024-11-19

## 2024-11-19 RX ORDER — LIDOCAINE HYDROCHLORIDE 20 MG/ML
JELLY TOPICAL ONCE
Status: CANCELLED | OUTPATIENT
Start: 2024-11-19 | End: 2024-11-19

## 2024-11-19 RX ORDER — CLOBETASOL PROPIONATE 0.5 MG/G
OINTMENT TOPICAL ONCE
Status: CANCELLED | OUTPATIENT
Start: 2024-11-19 | End: 2024-11-19

## 2024-11-19 RX ORDER — GENTAMICIN SULFATE 1 MG/G
OINTMENT TOPICAL ONCE
Status: CANCELLED | OUTPATIENT
Start: 2024-11-19 | End: 2024-11-19

## 2024-11-19 RX ORDER — LIDOCAINE 50 MG/G
OINTMENT TOPICAL ONCE
Status: CANCELLED | OUTPATIENT
Start: 2024-11-19 | End: 2024-11-19

## 2024-11-19 RX ORDER — TRIAMCINOLONE ACETONIDE 1 MG/G
OINTMENT TOPICAL ONCE
Status: CANCELLED | OUTPATIENT
Start: 2024-11-19 | End: 2024-11-19

## 2024-11-19 RX ORDER — LIDOCAINE 40 MG/G
CREAM TOPICAL ONCE
Status: CANCELLED | OUTPATIENT
Start: 2024-11-19 | End: 2024-11-19

## 2024-11-19 RX ORDER — MUPIROCIN 20 MG/G
OINTMENT TOPICAL ONCE
Status: CANCELLED | OUTPATIENT
Start: 2024-11-19 | End: 2024-11-19

## 2024-11-19 RX ORDER — BETAMETHASONE DIPROPIONATE 0.5 MG/G
CREAM TOPICAL ONCE
Status: CANCELLED | OUTPATIENT
Start: 2024-11-19 | End: 2024-11-19

## 2024-11-19 RX ORDER — BACITRACIN ZINC AND POLYMYXIN B SULFATE 500; 1000 [USP'U]/G; [USP'U]/G
OINTMENT TOPICAL ONCE
Status: CANCELLED | OUTPATIENT
Start: 2024-11-19 | End: 2024-11-19

## 2024-11-19 RX ORDER — GINSENG 100 MG
CAPSULE ORAL ONCE
Status: CANCELLED | OUTPATIENT
Start: 2024-11-19 | End: 2024-11-19

## 2024-11-19 RX ORDER — NEOMYCIN/BACITRACIN/POLYMYXINB 3.5-400-5K
OINTMENT (GRAM) TOPICAL ONCE
Status: CANCELLED | OUTPATIENT
Start: 2024-11-19 | End: 2024-11-19

## 2024-11-19 ASSESSMENT — PAIN SCALES - GENERAL: PAINLEVEL_OUTOF10: 0

## 2024-11-19 NOTE — PATIENT INSTRUCTIONS
DISCHARGE INSTRUCTIONS  Wound Clinic Physician Orders   [facility]  [address]  [place], [place] [address]  Telephone: [phone]      FAX [phone]    NAME:  Shona Disla  YOB: 1970  MEDICAL RECORD NUMBER:  [id number]  DATE:  11/19/2024    Congratulations!  You have completed your treatment.       Return to your Primary Care Physician for all your health issues.   Resume your ordinary activities as tolerated.   Take your medications as prescribed by your primary care physician.   Check your skin daily for cracks, bruises, sores, or dryness. Use a moisturizer as needed.   Clean and dry your skin, using mild soap and warm water (not hot).   Avoid alcohol and caffeine and do not smoke.  Maintain a nutritious diet.  Avoid pressure on your wound site. Keep your legs elevated above the level of the heart whenever possible.  Continue to use wraps/stockings/compression as prescribed.  Replace compression stockings every four to six months as needed to ensure proper fit.   Wear well-fitting shoes and leg garments.      Physician Signature:______________________    Date: ___________ Time:  ____________    Dr. Audie Thrasher            Electronically signed by Adeline Kimbrough RN on 11/19/2024 at 3:05 PM

## 2024-11-19 NOTE — PROGRESS NOTES
cm     Estimated Blood Loss:  None    Hemostasis Achieved:  not needed    Procedural Pain:  0  / 10     Post Procedural Pain:  0 / 10     Response to treatment:  Well tolerated by patient.       Plan:   Discussion as to preventative measures, topical steroid for erythema    Treatment Note please see attached Discharge Instructions    Written patient dismissal instructions given to patient and signed by patient or POA.           Patient Instructions     DISCHARGE INSTRUCTIONS  Wound Clinic Physician Orders   [facility]  [address]  [place], [place] [address]  Telephone: [phone]      FAX [phone]    NAME:  Shona Disla  YOB: 1970  MEDICAL RECORD NUMBER:  [id number]  DATE:  11/19/2024    Congratulations!  You have completed your treatment.       Return to your Primary Care Physician for all your health issues.   Resume your ordinary activities as tolerated.   Take your medications as prescribed by your primary care physician.   Check your skin daily for cracks, bruises, sores, or dryness. Use a moisturizer as needed.   Clean and dry your skin, using mild soap and warm water (not hot).   Avoid alcohol and caffeine and do not smoke.  Maintain a nutritious diet.  Avoid pressure on your wound site. Keep your legs elevated above the level of the heart whenever possible.  Continue to use wraps/stockings/compression as prescribed.  Replace compression stockings every four to six months as needed to ensure proper fit.   Wear well-fitting shoes and leg garments.      Physician Signature:______________________    Date: ___________ Time:  ____________    Dr. Audie Thrasher            Electronically signed by Adeline Kimbrough RN on 11/19/2024 at 3:05 PM                       Electronically signed by Audie Thrasher DPM on 11/19/2024 at 3:06 PM

## 2025-01-27 ENCOUNTER — HOSPITAL ENCOUNTER (OUTPATIENT)
Dept: WOMENS IMAGING | Age: 55
Discharge: HOME | End: 2025-01-27
Payer: MEDICAID

## 2025-01-27 VITALS — BODY MASS INDEX: 43.43 KG/M2 | WEIGHT: 230 LBS | HEIGHT: 61 IN

## 2025-01-27 DIAGNOSIS — Z12.31 VISIT FOR SCREENING MAMMOGRAM: ICD-10-CM

## 2025-01-27 PROCEDURE — 77063 BREAST TOMOSYNTHESIS BI: CPT

## (undated) DEVICE — UNIVERSAL DRAPE: Brand: MEDLINE INDUSTRIES, INC.

## (undated) DEVICE — INTRODUCER CATH MIC L45CM DIA4FR TIP L7CM B BVL S STL WIRE

## (undated) DEVICE — SYRINGE MED 10ML LUERLOCK TIP W/O SFTY DISP

## (undated) DEVICE — MINOR SET UP: Brand: MEDLINE INDUSTRIES, INC.

## (undated) DEVICE — APPLICATOR MEDICATED 26 CC SOLUTION HI LT ORNG CHLORAPREP

## (undated) DEVICE — STRIP,CLOSURE,WOUND,MEDI-STRIP,1/2X4: Brand: MEDLINE

## (undated) DEVICE — SET ADMIN NEEDLESS Y SITE RLER CLMP M SPIN LOK 10 GTT LEN

## (undated) DEVICE — PACK DRP UNIV W BK TBL MAYO STD BTM TOP SIDE UTIL CVR ZONE

## (undated) DEVICE — SET INTRO SHTH MIC L7CM DIA7FR 0.018IN NDL L2.75IN DIA21GA

## (undated) DEVICE — SHEET,DRAPE,53X77,STERILE: Brand: MEDLINE

## (undated) DEVICE — BANDAGE,GAUZE,BULKEE II,4.5"X4.1YD,STRL: Brand: MEDLINE

## (undated) DEVICE — KNIFE OPHTH 25MM 55DEG BVL UP ANG SFTY CATRCT XSTAR

## (undated) DEVICE — CATHETER ABLAT 7FR L60CM HEAT ELEMENT L7CM 0.025IN

## (undated) DEVICE — 3M™ COBAN™ NL STERILE NON-LATEX SELF-ADHERENT WRAP, 2086S, 6 IN X 5 YD (15 CM X 4,5 M), 12 ROLLS/CASE: Brand: 3M™ COBAN™

## (undated) DEVICE — INTENDED FOR TISSUE SEPARATION, AND OTHER PROCEDURES THAT REQUIRE A SHARP SURGICAL BLADE TO PUNCTURE OR CUT.: Brand: BARD-PARKER ® STAINLESS STEEL BLADES

## (undated) DEVICE — TOWEL,OR,DSP,ST,BLUE,STD,4/PK,20PK/CS: Brand: MEDLINE

## (undated) DEVICE — GAUZE FLUFF 2 PLY: Brand: DEROYAL

## (undated) DEVICE — PROVE COVER: Brand: UNBRANDED

## (undated) DEVICE — SOLUTION IV IRRIG POUR BRL 0.9% SODIUM CHL 2F7124

## (undated) DEVICE — Device

## (undated) DEVICE — SUTURE VCRL + SZ 4-0 L27IN ABSRB UD PS-2 3/8 CIR REV CUT VCP426H

## (undated) DEVICE — SHEATH INTRO 7 FRX7 CM 19 GAX45 CM SET CATH J TIP 2 END VNUS